# Patient Record
Sex: MALE | Race: BLACK OR AFRICAN AMERICAN | NOT HISPANIC OR LATINO
[De-identification: names, ages, dates, MRNs, and addresses within clinical notes are randomized per-mention and may not be internally consistent; named-entity substitution may affect disease eponyms.]

---

## 2021-03-02 DIAGNOSIS — Z00.00 ENCOUNTER FOR GENERAL ADULT MEDICAL EXAMINATION W/OUT ABNORMAL FINDINGS: ICD-10-CM

## 2021-03-03 ENCOUNTER — APPOINTMENT (OUTPATIENT)
Dept: ORTHOPEDIC SURGERY | Facility: CLINIC | Age: 71
End: 2021-03-03
Payer: MEDICARE

## 2021-03-03 VITALS — WEIGHT: 195 LBS | BODY MASS INDEX: 25.03 KG/M2 | HEIGHT: 74 IN

## 2021-03-03 PROCEDURE — 99205 OFFICE O/P NEW HI 60 MIN: CPT

## 2021-03-03 PROCEDURE — 99072 ADDL SUPL MATRL&STAF TM PHE: CPT

## 2021-03-03 PROCEDURE — 73564 X-RAY EXAM KNEE 4 OR MORE: CPT | Mod: RT

## 2021-03-03 RX ORDER — CELECOXIB 100 MG/1
100 CAPSULE ORAL
Qty: 60 | Refills: 2 | Status: ACTIVE | COMMUNITY
Start: 2021-03-03 | End: 1900-01-01

## 2021-03-03 NOTE — PHYSICAL EXAM
[de-identified] : Patient is well nourished, well-developed, in no acute distress, with appropriate mood and affect. The patient is oriented to time, place, and person. Respirations are even and unlabored. Gait evaluation does reveal a limp. There is no inguinal adenopathy. Examination of the contralateral knee shows normal range of motion, strength, no tenderness, and intact skin. The affected limb is well-perfused, without skin lesions, shows a grossly normal motor and sensory examination. Knee motion is significantly reduced and does cause significant pain.  Well-healed midline surgical scar.  The knee moves from 0-120 degrees. The knee is stable within that range-of-motion to AP and ML stress. The alignment of the knee is neutral. Muscle strength is normal. Pedal pulses are palpable. Hip examination was negative.\par  [de-identified] : AP, lateral, sunrise knee x-rays of the right knee were ordered and obtained in the office and demonstrate a right total knee arthroplasty with evidence of femoral component failure and subsidence on the medial side.  The tibial component appears well fixed.  \par \par The patient brings in the bone scan of the right knee.  I reviewed the bone scan with the patient which demonstrates increased uptake in the medial femoral condyle consistent with loosening.

## 2021-03-03 NOTE — DISCUSSION/SUMMARY
[de-identified] : This patient is a failed right total knee arthroplasty.  I reviewed the options with the patient with regard to nonsurgical and surgical intervention.  The patient opts for surgical intervention of a right total knee arthroplasty revision.\par \par He is a Christian and I gave him a Christian forms to fill out with his elders to determine which blood products or he will will not be willing to take.\par \par The patient is an appropriate candidate for consideration of right revision total knee arthroplasty. This recommendation is based on the patient's pain, function, and bone stock. An extensive discussion was conducted of the natural history of this particular problem and the variety of surgical and non-surgical treatment options available to the patient. A risk/benefit analysis was discussed with the patient reviewing the advantages and disadvantages of surgical intervention at this time. A full explanation was given of the nature and the purpose of the procedure and anesthesia, its benefits, possible alternative methods of diagnosis or treatment, the risks involved, the possibility of complications, the foreseeable consequences of the procedure and the possible results of the non-treatment. I reviewed the plan of care and I also used a model of a revision joint replacement implant equivalent to the one that will be used for their revision total joint replacement. The ability to secure the implant utilizing cement or cementless (press-fit) was discussed with the patient. The patient agrees with the plan of care, as well as the use of implants for their revision joint replacement. \par \par No guarantee or assurance was made as to the results that may be obtained. Specifically, the risks were identified to include, but are not limited to the following: Infection, phlebitis, pulmonary embolism, death, paralysis, dislocation, pain, stiffness, instability, limp, weakness, breakage, leg-length inequality, uncontrolled bleeding, nerve injury, blood vessel injury, pressure sores, anesthetic risks, delayed healing of wound and bone, and wear and loosening. Further discussion was undertaken with the patient about the details of surgical preparation, treatment, and postoperative rehabilitation including medical clearance, autotransfusion, the hospital course, and the postoperative rehabilitation involved. Reimplantation may require cemented or cementless components, or both, depending upon a variety of factors that must be assessed at the time of surgery. The need for bone graft (either autograft or allograft) to enhance the chance for success of the procedure(s) was discussed. All in all, I feel that this patient is a good candidate for surgical reconstruction.\par \par The patient and I discussed the current SARS-CoV-2 (COVID-19) pandemic which has affected our local hospitals. We discussed that our hospitals treat patients with COVID-19. All efforts will be made to avoid cohorting the patient with diagnosed or suspected COVID-19 patient. They also understand that we will screen them 24-48 hours prior to surgery. Despite our best efforts, there is a potential risk for iatrogenic transmission of COVID-19 to the patient during the perioperative period. Debi COVID-19 during the perioperative period may increase the patient´s risks of an adverse outcome including postoperative pneumonia, difficulty breathing, requirement for a breathing tube (general endotracheal intubation), and death. The patient is understanding of this risk, and is willing to proceed with surgery at this time.\par \par Send him to the lab for an ESR and CRP for further blood work as well.\par \par Celebrex prescribed to bridge him to surgery.

## 2021-03-03 NOTE — HISTORY OF PRESENT ILLNESS
[de-identified] : This is very nice 70-year-old gentleman experiencing 6 weeks of right knee pain, which is severe in intensity. The pain substantially limits activities of daily living. Walking tolerance is reduced.  He is referred by Dr. Clark.  He has a history of a right total knee arthroplasty February 2012 at the [facility] for special surgery by Dr. Alex.  He healed well from the surgery and did well until approximately 6 weeks ago.  Since then he has had increasing pain in the right knee that is worse than before his total knee arthroplasty.  He walks without an assistive device but walks with a very antalgic gait.  He has been ruled out for infection already by Dr. Clark with an aspiration on February 9, 2021 with results demonstrating 1076/12%/no growth.  ESR and CRP were not sent.  NSAIDs have not been helpful in improving the pain.  The patient denies any radiation of the pain to the feet and it is not associated with numbness, tingling, or weakness.  The knee does feel unstable.

## 2021-03-05 ENCOUNTER — TRANSCRIPTION ENCOUNTER (OUTPATIENT)
Age: 71
End: 2021-03-05

## 2021-03-06 ENCOUNTER — TRANSCRIPTION ENCOUNTER (OUTPATIENT)
Age: 71
End: 2021-03-06

## 2021-03-06 ENCOUNTER — RESULT REVIEW (OUTPATIENT)
Age: 71
End: 2021-03-06

## 2021-03-06 ENCOUNTER — INPATIENT (INPATIENT)
Facility: HOSPITAL | Age: 71
LOS: 0 days | Discharge: HOME | DRG: 343 | End: 2021-03-06
Attending: SURGERY | Admitting: SURGERY
Payer: MEDICARE

## 2021-03-06 VITALS
DIASTOLIC BLOOD PRESSURE: 101 MMHG | WEIGHT: 195.11 LBS | HEIGHT: 74 IN | RESPIRATION RATE: 18 BRPM | TEMPERATURE: 98 F | OXYGEN SATURATION: 96 % | SYSTOLIC BLOOD PRESSURE: 174 MMHG | HEART RATE: 93 BPM

## 2021-03-06 VITALS
RESPIRATION RATE: 16 BRPM | DIASTOLIC BLOOD PRESSURE: 80 MMHG | TEMPERATURE: 98 F | SYSTOLIC BLOOD PRESSURE: 135 MMHG | HEART RATE: 80 BPM | OXYGEN SATURATION: 97 %

## 2021-03-06 DIAGNOSIS — K37 UNSPECIFIED APPENDICITIS: ICD-10-CM

## 2021-03-06 DIAGNOSIS — Z90.49 ACQUIRED ABSENCE OF OTHER SPECIFIED PARTS OF DIGESTIVE TRACT: Chronic | ICD-10-CM

## 2021-03-06 DIAGNOSIS — Z96.659 PRESENCE OF UNSPECIFIED ARTIFICIAL KNEE JOINT: Chronic | ICD-10-CM

## 2021-03-06 DIAGNOSIS — K40.90 UNILATERAL INGUINAL HERNIA, WITHOUT OBSTRUCTION OR GANGRENE, NOT SPECIFIED AS RECURRENT: Chronic | ICD-10-CM

## 2021-03-06 LAB
ALBUMIN SERPL ELPH-MCNC: 4.3 G/DL — SIGNIFICANT CHANGE UP (ref 3.3–5)
ALP SERPL-CCNC: 91 U/L — SIGNIFICANT CHANGE UP (ref 40–120)
ALT FLD-CCNC: 17 U/L — SIGNIFICANT CHANGE UP (ref 10–45)
ANION GAP SERPL CALC-SCNC: 15 MMOL/L — SIGNIFICANT CHANGE UP (ref 5–17)
APPEARANCE UR: CLEAR — SIGNIFICANT CHANGE UP
APTT BLD: 26.3 SEC — LOW (ref 27.5–35.5)
AST SERPL-CCNC: 21 U/L — SIGNIFICANT CHANGE UP (ref 10–40)
BACTERIA # UR AUTO: NEGATIVE — SIGNIFICANT CHANGE UP
BASOPHILS # BLD AUTO: 0 K/UL — SIGNIFICANT CHANGE UP (ref 0–0.2)
BASOPHILS NFR BLD AUTO: 0 % — SIGNIFICANT CHANGE UP (ref 0–2)
BILIRUB SERPL-MCNC: 0.3 MG/DL — SIGNIFICANT CHANGE UP (ref 0.2–1.2)
BILIRUB UR-MCNC: NEGATIVE — SIGNIFICANT CHANGE UP
BLD GP AB SCN SERPL QL: NEGATIVE — SIGNIFICANT CHANGE UP
BUN SERPL-MCNC: 17 MG/DL — SIGNIFICANT CHANGE UP (ref 7–23)
CALCIUM SERPL-MCNC: 9.4 MG/DL — SIGNIFICANT CHANGE UP (ref 8.4–10.5)
CHLORIDE SERPL-SCNC: 97 MMOL/L — SIGNIFICANT CHANGE UP (ref 96–108)
CO2 SERPL-SCNC: 24 MMOL/L — SIGNIFICANT CHANGE UP (ref 22–31)
COLOR SPEC: SIGNIFICANT CHANGE UP
CREAT SERPL-MCNC: 0.95 MG/DL — SIGNIFICANT CHANGE UP (ref 0.5–1.3)
DIFF PNL FLD: NEGATIVE — SIGNIFICANT CHANGE UP
EOSINOPHIL # BLD AUTO: 0 K/UL — SIGNIFICANT CHANGE UP (ref 0–0.5)
EOSINOPHIL NFR BLD AUTO: 0 % — SIGNIFICANT CHANGE UP (ref 0–6)
EPI CELLS # UR: 0 /HPF — SIGNIFICANT CHANGE UP
GAS PNL BLDV: SIGNIFICANT CHANGE UP
GLUCOSE SERPL-MCNC: 132 MG/DL — HIGH (ref 70–99)
GLUCOSE UR QL: NEGATIVE — SIGNIFICANT CHANGE UP
HCT VFR BLD CALC: 44.3 % — SIGNIFICANT CHANGE UP (ref 39–50)
HGB BLD-MCNC: 13 G/DL — SIGNIFICANT CHANGE UP (ref 13–17)
HYALINE CASTS # UR AUTO: 0 /LPF — SIGNIFICANT CHANGE UP (ref 0–2)
INR BLD: 1.01 RATIO — SIGNIFICANT CHANGE UP (ref 0.88–1.16)
KETONES UR-MCNC: NEGATIVE — SIGNIFICANT CHANGE UP
LEUKOCYTE ESTERASE UR-ACNC: NEGATIVE — SIGNIFICANT CHANGE UP
LIDOCAIN IGE QN: 67 U/L — HIGH (ref 7–60)
LYMPHOCYTES # BLD AUTO: 0.95 K/UL — LOW (ref 1–3.3)
LYMPHOCYTES # BLD AUTO: 8 % — LOW (ref 13–44)
MCHC RBC-ENTMCNC: 20.9 PG — LOW (ref 27–34)
MCHC RBC-ENTMCNC: 29.3 GM/DL — LOW (ref 32–36)
MCV RBC AUTO: 71.2 FL — LOW (ref 80–100)
MONOCYTES # BLD AUTO: 0.86 K/UL — SIGNIFICANT CHANGE UP (ref 0–0.9)
MONOCYTES NFR BLD AUTO: 7.2 % — SIGNIFICANT CHANGE UP (ref 2–14)
NEUTROPHILS # BLD AUTO: 10.1 K/UL — HIGH (ref 1.8–7.4)
NEUTROPHILS NFR BLD AUTO: 84.8 % — HIGH (ref 43–77)
NITRITE UR-MCNC: NEGATIVE — SIGNIFICANT CHANGE UP
PH UR: 6.5 — SIGNIFICANT CHANGE UP (ref 5–8)
PLATELET # BLD AUTO: 289 K/UL — SIGNIFICANT CHANGE UP (ref 150–400)
POTASSIUM SERPL-MCNC: 3.7 MMOL/L — SIGNIFICANT CHANGE UP (ref 3.5–5.3)
POTASSIUM SERPL-SCNC: 3.7 MMOL/L — SIGNIFICANT CHANGE UP (ref 3.5–5.3)
PROT SERPL-MCNC: 7.6 G/DL — SIGNIFICANT CHANGE UP (ref 6–8.3)
PROT UR-MCNC: NEGATIVE — SIGNIFICANT CHANGE UP
PROTHROM AB SERPL-ACNC: 12.1 SEC — SIGNIFICANT CHANGE UP (ref 10.6–13.6)
RBC # BLD: 6.22 M/UL — HIGH (ref 4.2–5.8)
RBC # FLD: 17.1 % — HIGH (ref 10.3–14.5)
RBC CASTS # UR COMP ASSIST: 0 /HPF — SIGNIFICANT CHANGE UP (ref 0–4)
RH IG SCN BLD-IMP: POSITIVE — SIGNIFICANT CHANGE UP
SARS-COV-2 RNA SPEC QL NAA+PROBE: SIGNIFICANT CHANGE UP
SODIUM SERPL-SCNC: 136 MMOL/L — SIGNIFICANT CHANGE UP (ref 135–145)
SP GR SPEC: 1.02 — SIGNIFICANT CHANGE UP (ref 1.01–1.02)
UROBILINOGEN FLD QL: NEGATIVE — SIGNIFICANT CHANGE UP
WBC # BLD: 11.91 K/UL — HIGH (ref 3.8–10.5)
WBC # FLD AUTO: 11.91 K/UL — HIGH (ref 3.8–10.5)
WBC UR QL: 0 /HPF — SIGNIFICANT CHANGE UP (ref 0–5)

## 2021-03-06 PROCEDURE — 88304 TISSUE EXAM BY PATHOLOGIST: CPT | Mod: 26

## 2021-03-06 PROCEDURE — 74177 CT ABD & PELVIS W/CONTRAST: CPT | Mod: 26,MA

## 2021-03-06 PROCEDURE — 93010 ELECTROCARDIOGRAM REPORT: CPT

## 2021-03-06 PROCEDURE — 99285 EMERGENCY DEPT VISIT HI MDM: CPT

## 2021-03-06 RX ORDER — CEFOTETAN DISODIUM 1 G
1 VIAL (EA) INJECTION ONCE
Refills: 0 | Status: DISCONTINUED | OUTPATIENT
Start: 2021-03-06 | End: 2021-03-06

## 2021-03-06 RX ORDER — ENOXAPARIN SODIUM 100 MG/ML
40 INJECTION SUBCUTANEOUS EVERY 24 HOURS
Refills: 0 | Status: DISCONTINUED | OUTPATIENT
Start: 2021-03-06 | End: 2021-03-06

## 2021-03-06 RX ORDER — AMLODIPINE BESYLATE 2.5 MG/1
10 TABLET ORAL EVERY 24 HOURS
Refills: 0 | Status: DISCONTINUED | OUTPATIENT
Start: 2021-03-06 | End: 2021-03-06

## 2021-03-06 RX ORDER — CEFOTETAN DISODIUM 1 G
2 VIAL (EA) INJECTION EVERY 12 HOURS
Refills: 0 | Status: DISCONTINUED | OUTPATIENT
Start: 2021-03-06 | End: 2021-03-06

## 2021-03-06 RX ORDER — OXYCODONE HYDROCHLORIDE 5 MG/1
5 TABLET ORAL EVERY 6 HOURS
Refills: 0 | Status: DISCONTINUED | OUTPATIENT
Start: 2021-03-06 | End: 2021-03-06

## 2021-03-06 RX ORDER — ACETAMINOPHEN 500 MG
650 TABLET ORAL EVERY 6 HOURS
Refills: 0 | Status: DISCONTINUED | OUTPATIENT
Start: 2021-03-06 | End: 2021-03-06

## 2021-03-06 RX ORDER — ACETAMINOPHEN 500 MG
2 TABLET ORAL
Qty: 0 | Refills: 0 | DISCHARGE
Start: 2021-03-06

## 2021-03-06 RX ORDER — SODIUM CHLORIDE 9 MG/ML
1000 INJECTION, SOLUTION INTRAVENOUS
Refills: 0 | Status: DISCONTINUED | OUTPATIENT
Start: 2021-03-06 | End: 2021-03-06

## 2021-03-06 RX ORDER — ACETAMINOPHEN 500 MG
975 TABLET ORAL ONCE
Refills: 0 | Status: COMPLETED | OUTPATIENT
Start: 2021-03-06 | End: 2021-03-06

## 2021-03-06 RX ADMIN — Medication 100 GRAM(S): at 08:10

## 2021-03-06 RX ADMIN — SODIUM CHLORIDE 100 MILLILITER(S): 9 INJECTION, SOLUTION INTRAVENOUS at 08:50

## 2021-03-06 RX ADMIN — Medication 975 MILLIGRAM(S): at 04:00

## 2021-03-06 RX ADMIN — OXYCODONE HYDROCHLORIDE 5 MILLIGRAM(S): 5 TABLET ORAL at 08:35

## 2021-03-06 NOTE — H&P ADULT - NSHPLABSRESULTS_GEN_ALL_CORE
----------  LABORATORY DATA:  ----------                        13.0   11.91 )-----------( 289      ( 06 Mar 2021 03:49 )             44.3               03-    136  |  97  |  17  ----------------------------<  132<H>  3.7   |  24  |  0.95    Ca    9.4      06 Mar 2021 03:49    TPro  7.6  /  Alb  4.3  /  TBili  0.3  /  DBili  x   /  AST  21  /  ALT  17  /  AlkPhos  91  03-06    LIVER FUNCTIONS - ( 06 Mar 2021 03:49 )  Alb: 4.3 g/dL / Pro: 7.6 g/dL / ALK PHOS: 91 U/L / ALT: 17 U/L / AST: 21 U/L / GGT: x           PT/INR - ( 06 Mar 2021 03:49 )   PT: 12.1 sec;   INR: 1.01 ratio    PTT - ( 06 Mar 2021 03:49 )  PTT:26.3 sec    Urinalysis Basic - ( 06 Mar 2021 03:49 )    Color: Light Yellow / Appearance: Clear / S.021 / pH: x  Gluc: x / Ketone: Negative  / Bili: Negative / Urobili: Negative   Blood: x / Protein: Negative / Nitrite: Negative   Leuk Esterase: Negative / RBC: 0 /hpf / WBC 0 /HPF   Sq Epi: x / Non Sq Epi: 0 /hpf / Bacteria: Negative    ----------  RADIOGRAPHIC DATA:  ---------    < from: CT Abdomen and Pelvis w/ IV Cont (21 @ 05:23) >    FINDINGS:    LOWER CHEST: Within normal limits.    LIVER: Within normal limits.  BILE DUCTS: Mild intra and extra hepatic biliary dilatation with common bile duct measuring 1 cm, postcholecystectomy.  GALLBLADDER: Cholecystectomy.  SPLEEN: Within normal limits.  PANCREAS: Within normal limits.  ADRENALS: Bilateral adrenal nodules, largest measuring up to 2.1 cm on the right side. Consider nonemergent correlation with MR provided no contraindications.  KIDNEYS/URETERS: Within normal limits.    BLADDER: Within normal limits.  REPRODUCTIVE ORGANS: Heterogeneous prominent prostate measuring up to 5.5 cm, cause mild mass effect and protrusion of the bladder base.    BOWEL: Base as well as proximal appendix is dilated and fluid-filled measuring up to 1.2 cm with thickened wall and hyperemia, compatible with acute appendicitis. Trace periappendiceal stranding. No signs of appendiceal abscess or perforation.No bowel obstruction. Nonspecific twisting of bowel mesentery identified in the left abdomen. Surgical clips identified surrounding the stomach.  PERITONEUM: No ascites.  VESSELS:  Within normal limits.  RETROPERITONEUM: No lymphadenopathy.  ABDOMINAL WALL: A small right groin hernia containing nonobstructive distal ileal folds identified.  BONES: Multilevel degenerative changes of the spine. Mildanterolisthesis of L4 on L5 secondary to bilateral facet spondylolysis. Advanced degenerative disc disease at L5-S1.    IMPRESSION:    Findings compatible with acute appendicitis. No signs of perforation or periappendiceal abscess at this time.    Additional findings as mentioned above.    < end of copied text >

## 2021-03-06 NOTE — H&P ADULT - HISTORY OF PRESENT ILLNESS
70M hx of htn p/w 1d of mid abd pain progressive to the rlq. Pain started approx 18:00 on day prior to presentation was sharp in nature, tolerated a diet and had formed BM.  Pain did not resolve, progressed to localize to the RLQ. Patient looked up condition online found that this could be appendicitis and presented to ED for further evaluation.     Denies ha / cp / sob / dizziness / nausea / vomiting / fevers / chills.     Of note, patient is a [employer] and refuses blood products.

## 2021-03-06 NOTE — ED PROVIDER NOTE - PHYSICAL EXAMINATION
Gen: AAOx3, non-toxic  Head: NCAT  HEENT: EOMI, oral mucosa moist, normal conjunctiva  Lung: CTAB, no respiratory distress, speaking in full sentences  CV: RRR, no murmurs, rubs or gallops  Abd: soft, RLQ ttp, no guarding, no CVA tenderness  MSK: no visible deformities  : Completed by Dr. Cuevas  Neuro: No focal sensory or motor deficits  Skin: Warm, well perfused, no rash  Psych: normal affect.   ~Mario Higgins M.D. Resident

## 2021-03-06 NOTE — ED ADULT NURSE NOTE - NSIMPLEMENTINTERV_GEN_ALL_ED
Implemented All Universal Safety Interventions:  [place] to call system. Call bell, personal items and telephone within reach. Instruct patient to call for assistance. Room bathroom lighting operational. Non-slip footwear when patient is off stretcher. Physically safe environment: no spills, clutter or unnecessary equipment. Stretcher in lowest position, wheels locked, appropriate side rails in place.

## 2021-03-06 NOTE — ED PROVIDER NOTE - NS ED ROS FT
GENERAL: No fever or chills, EYES: no change in vision, HEENT: no trouble swallowing or speaking, CARDIAC: no chest pain, PULMONARY: no cough or SOB, GI: +abdominal pain, no nausea, no vomiting, no diarrhea or constipation, : No changes in urination, SKIN: no rashes, NEURO: no headache,  MSK: No joint pain ~Mario Higgins M.D. Resident

## 2021-03-06 NOTE — ED ADULT NURSE NOTE - CHIEF COMPLAINT QUOTE
RLQ abdominal pain starting today at 6pm. States the pain began in periumbilical area then moved to RLQ accompanied by intermittent nausea. RLQ tender to palpation. Denies vomiting, diarrhea, fever. Reports having a CT scan of abdomen pelvis with IV contrast earlier today due to abnormal blood test result but does not have results. PMH of HTN.

## 2021-03-06 NOTE — ED PROVIDER NOTE - ATTENDING CONTRIBUTION TO CARE
attending Pollack: 70yM h/o HTN, remote h/o cholecystectomy p/w 9 hours of abdominal pain. Began as sharp periumbilical pain, then migrated to RLQ assoc with nausea. Denies vomiting, fever, chills, anorexia, urinary symptoms. Incidentally, pt had outpatient CT A/P yesterday for possible renal ?lesion being followed outpatient. Reports normal renal function. On exam, well appearing, afebrile, abdomen soft, tender in RLQ without r/g/r, negative Rovsing sign, no testicular tenderness. Will obtain labs, UA, CT A/P eval appendicitis vs diverticulitis, pain control, reassess.

## 2021-03-06 NOTE — ED ADULT TRIAGE NOTE - CHIEF COMPLAINT QUOTE
RLQ abdominal pain starting today at 6pm. States the pain began in periumbilical area then moved to RLQ. RLQ tender to palpation. Denies nausea, vomiting, diarrhea, fever. PMH of HTN. RLQ abdominal pain starting today at 6pm. States the pain began in periumbilical area then moved to RLQ accompanied by intermittent nausea. RLQ tender to palpation. Denies vomiting, diarrhea, fever. Reports having a CT scan of abdomen pelvis with IV contrast earlier today due to abnormal blood test result but does not have results. PMH of HTN.

## 2021-03-06 NOTE — DISCHARGE NOTE PROVIDER - CARE PROVIDER_API CALL
Bayron Yañez)  Surgery  [address] [facility] [address]  [place] [address]  Phone: [phone]  Fax: [phone]  Follow Up Time: 2 weeks

## 2021-03-06 NOTE — H&P ADULT - ASSESSMENT
70M [employer] p/w 1d of abd pain found to have clinical, laboratory and radiographic findings consistent with acute, non-perforated appendicitis. Patient hemodynamically stable in NAD at present time, will plan for admission and appendectomy.     - admit to general / red surgery, KARLY SUTTON MD, floor bed  - diet: NPO  - IVF: LR @ 100cc/h  - ABX: Cefotetan   - pain control  - vte ppx  - encourage ambulation / cough / deep breathing / incentive spirometry   - disposition: floor admission at present time  - consented for laparoscopic appendectomy, possible open appendectomy - NO BLOOD PRODUCTS  - added on to OR    Plan discussed with attending surgeon KARLY SUTTON MD.     Please contact [facility] Surgery (P: [phone]) with any questions.    KARLY Joshua MD, PGY-III  Surgery, Red Team  Pager: [phone]  [facility]

## 2021-03-06 NOTE — ED PROVIDER NOTE - CLINICAL SUMMARY MEDICAL DECISION MAKING FREE TEXT BOX
70yM h/o HTN presents with RLQ pain and nausea of 1 day duration. Concern for but not limited to appy vs gastritis vs pancreatitis vs biliary pathology. Will get ekg, labs, blood gas, lipase, UA, CT abd pelvis/ pain management and reassess

## 2021-03-06 NOTE — H&P ADULT - NSHPPHYSICALEXAM_GEN_ALL_CORE
Vital Signs Last 24 Hrs  T(C): 36.7 (06 Mar 2021 03:26), Max: 36.7 (06 Mar 2021 03:26)  T(F): 98 (06 Mar 2021 03:26), Max: 98 (06 Mar 2021 03:26)  HR: 90 (06 Mar 2021 03:26) (90 - 93)  BP: 161/90 (06 Mar 2021 03:26) (161/90 - 174/101)  RR: 17 (06 Mar 2021 03:26) (17 - 18)  SpO2: 97% (06 Mar 2021 03:26) (96% - 97%)    PHYSICAL EXAM:  Gen: WD, WN, in no acute distress.  HEENT: PERRLA, EOMI. oropharynx clear.  Lungs: Respirations unlabored.   Abd: Soft, tender in rlq, nondistended.

## 2021-03-06 NOTE — ED ADULT NURSE NOTE - CHPI ED NUR SYMPTOMS NEG
no abdominal distension/no blood in stool/no burning urination/no chills/no diarrhea/no dysuria/no hematuria/no vomiting

## 2021-03-06 NOTE — ED PROVIDER NOTE - OBJECTIVE STATEMENT
70yM h/o HTN presents with abdominal pain of 1 day duration. Patient reports periumbilical/ruq initially which has migrated to RLQ with associated nausea. Underwent CT abd/pelvis with contrast earlier in the afternoon to evaluate for renal pathology but pain started afterwards. No fever, chest pain, back pain, sob, urinary or bowel irregularities.

## 2021-03-06 NOTE — PRE-OP CHECKLIST - TEMPERATURE IN FAHRENHEIT (DEGREES F)
Rama returned call to Sherin Partida's office, but staff was unavailable.  Please return her call, but if she is not available she stated it is ok to leave a detailed message on her voicemail.   98.2

## 2021-03-06 NOTE — DISCHARGE NOTE NURSING/CASE MANAGEMENT/SOCIAL WORK - PATIENT PORTAL LINK FT
You can access the FollowMyHealth Patient Portal offered by [facility] by registering at the following website: [email or web address]. By joining [facility]’s FollowMyHealth portal, you will also be able to view your health information using other applications (apps) compatible with our system.

## 2021-03-06 NOTE — ED ADULT NURSE NOTE - OBJECTIVE STATEMENT
70y old male PMH HTN, cataracts 70y old male PMH HTN, cataracts, 70y old male PMH HTN, cataracts walk in from triage c/o abdominal pain. A&Ox3. Patient states he was having abdominal pain in lower right quadrant starting today at 6pm, the pain had started in the periumbilical area then moved to lower right abdomen endorses intermittent nausea. He states that he had received an CT scan due to an abnormal lab result but does not have results. He denies vomiting, diarrhea, fever, chest pain, sob, ha, urinary symptoms, hematuria. Patient is well appearing, gross neuro intact, lungs cta bilaterally, no difficulty speaking in complete sentences, abdomen soft tender to palpation in lower right quadrant nondistended, skin intact. IV inserted, call bell within reach.

## 2021-03-06 NOTE — DISCHARGE NOTE PROVIDER - NSDCMRMEDTOKEN_GEN_ALL_CORE_FT
acetaminophen 325 mg oral tablet: 2 tab(s) orally every 6 hours, As needed, Mild Pain (1 - 3)  AMLODIPINE BESYLATE 10 MG TAB: TAKE 1 TABLET BY MOUTH EVERY DAY

## 2021-03-06 NOTE — DISCHARGE NOTE PROVIDER - HOSPITAL COURSE
You were diagnosed with acute appendicitis and underwent laparoscopic appendectomy in the OR on 3/6/21. The patient tolerated the procedure well. Post-operatively the patient was sent to the PACU. The patient was hemodynamically stable and was transferred to a surgical floor. The patient's pain was controlled by IV pain medications and then by PO pain medications. The patient was started on a regular diet and tolerated it well. The patient was placed on home medications. At the time of discharge, the patient was hemodynamically stable, was tolerating PO diet, was voiding urine and passing stool, was ambulating, and was comfortable with adequate pain control. The patient was instructed to follow up with Dr. Yañez within 2 weeks after discharge from the hospital. The patient felt comfortable with discharge. The patient was discharged home with instructions to take over the counter tylenol extra strength every 6 hours as needed. The patient had no other issues.

## 2021-03-06 NOTE — DISCHARGE NOTE PROVIDER - NSDCFUADDINST_GEN_ALL_CORE_FT
WOUND CARE:  Please keep incisions clean and dry. Please do not Scrub or rub incisions. Do not use lotion or powder on incisions.   BATHING: You may shower and/or sponge bathe. You may use warm soapy water in the shower and rinse, pat dry.  ACTIVITY: No heavy lifting or straining. Otherwise, you may return to your usual level of physical activity. If you are taking narcotic pain medication DO NOT drive a car, operate machinery or make important decisions.  DIET: Return to your usual diet.  NOTIFY YOUR SURGEON IF YOU HAVE: any bleeding that does not stop, any pus draining from your wound(s), any fever (over 100.4 F) persistent nausea/vomiting, or if your pain is not controlled on your discharge pain medications, unable to urinate.  Please follow up with your primary care physician in one week regarding your hospitalization, bring copies of your discharge paperwork.  Please follow up with your surgeon, Dr. Yañez

## 2021-03-06 NOTE — H&P ADULT - ATTENDING COMMENTS
pt seen and examined  agree with above  npo, ivf, iv abx  r/b/a d/w pt for lap appy, poss open  consent signed in chart.  no blood products per pt wishes  or today.

## 2021-03-15 LAB — SURGICAL PATHOLOGY STUDY: SIGNIFICANT CHANGE UP

## 2021-03-16 PROCEDURE — 93005 ELECTROCARDIOGRAM TRACING: CPT

## 2021-03-16 PROCEDURE — 83690 ASSAY OF LIPASE: CPT

## 2021-03-16 PROCEDURE — C9399: CPT

## 2021-03-16 PROCEDURE — U0005: CPT

## 2021-03-16 PROCEDURE — C1889: CPT

## 2021-03-16 PROCEDURE — 81001 URINALYSIS AUTO W/SCOPE: CPT

## 2021-03-16 PROCEDURE — 83605 ASSAY OF LACTIC ACID: CPT

## 2021-03-16 PROCEDURE — 84295 ASSAY OF SERUM SODIUM: CPT

## 2021-03-16 PROCEDURE — 86900 BLOOD TYPING SEROLOGIC ABO: CPT

## 2021-03-16 PROCEDURE — 82803 BLOOD GASES ANY COMBINATION: CPT

## 2021-03-16 PROCEDURE — 86901 BLOOD TYPING SEROLOGIC RH(D): CPT

## 2021-03-16 PROCEDURE — 74177 CT ABD & PELVIS W/CONTRAST: CPT

## 2021-03-16 PROCEDURE — 85610 PROTHROMBIN TIME: CPT

## 2021-03-16 PROCEDURE — 85018 HEMOGLOBIN: CPT

## 2021-03-16 PROCEDURE — 85730 THROMBOPLASTIN TIME PARTIAL: CPT

## 2021-03-16 PROCEDURE — 87635 SARS-COV-2 COVID-19 AMP PRB: CPT

## 2021-03-16 PROCEDURE — 82947 ASSAY GLUCOSE BLOOD QUANT: CPT

## 2021-03-16 PROCEDURE — 80053 COMPREHEN METABOLIC PANEL: CPT

## 2021-03-16 PROCEDURE — 85014 HEMATOCRIT: CPT

## 2021-03-16 PROCEDURE — 99285 EMERGENCY DEPT VISIT HI MDM: CPT | Mod: 25

## 2021-03-16 PROCEDURE — 85025 COMPLETE CBC W/AUTO DIFF WBC: CPT

## 2021-03-16 PROCEDURE — 88304 TISSUE EXAM BY PATHOLOGIST: CPT

## 2021-03-16 PROCEDURE — 96374 THER/PROPH/DIAG INJ IV PUSH: CPT

## 2021-03-16 PROCEDURE — 86850 RBC ANTIBODY SCREEN: CPT

## 2021-03-16 PROCEDURE — 82330 ASSAY OF CALCIUM: CPT

## 2021-03-16 PROCEDURE — 84132 ASSAY OF SERUM POTASSIUM: CPT

## 2021-03-16 PROCEDURE — 82435 ASSAY OF BLOOD CHLORIDE: CPT

## 2021-03-20 PROBLEM — I10 ESSENTIAL (PRIMARY) HYPERTENSION: Chronic | Status: ACTIVE | Noted: 2021-03-06

## 2021-03-23 ENCOUNTER — APPOINTMENT (OUTPATIENT)
Dept: ORTHOPEDIC SURGERY | Facility: CLINIC | Age: 71
End: 2021-03-23
Payer: MEDICARE

## 2021-03-23 PROCEDURE — 99214 OFFICE O/P EST MOD 30 MIN: CPT

## 2021-03-23 PROCEDURE — 99072 ADDL SUPL MATRL&STAF TM PHE: CPT

## 2021-03-23 NOTE — PHYSICAL EXAM
[de-identified] : Patient is well nourished, well-developed, in no acute distress, with appropriate mood and affect. The patient is oriented to time, place, and person. Respirations are even and unlabored. Gait evaluation does reveal a limp. There is no inguinal adenopathy. Examination of the contralateral knee shows normal range of motion, strength, no tenderness, and intact skin. The affected limb is well-perfused, without skin lesions, shows a grossly normal motor and sensory examination. Knee motion is significantly reduced and does cause significant pain.  Well-healed midline surgical scar.  The knee moves from 0-120 degrees. The knee is stable within that range-of-motion to AP and ML stress. The alignment of the knee is neutral. Muscle strength is normal. Pedal pulses are palpable. Hip examination was negative.\par  [de-identified] : AP, lateral, sunrise knee x-rays of the right knee were brought in by the patient which I reviewed and demonstrate a right total knee arthroplasty with evidence of femoral component failure and subsidence on the medial side.  The tibial component appears well fixed.  \par \par The patient brings in the bone scan of the right knee.  I reviewed the bone scan with the patient which demonstrates increased uptake in the medial femoral condyle consistent with loosening.

## 2021-03-23 NOTE — HISTORY OF PRESENT ILLNESS
[de-identified] : This is very nice 70-year-old gentleman experiencing 2 months of right knee pain, which is severe in intensity.  I have previously diagnosed him with a failed right total knee arthroplasty secondary to femoral component loosening.  The pain substantially limits activities of daily living. Walking tolerance is reduced.  He is referred by Dr. Clark.  He has a history of a right total knee arthroplasty February 2012 at the [facility] by Dr. Alex.  He healed well from the surgery and did well until approximately 6 weeks ago.  Since then he has had increasing pain in the right knee that is worse than before his total knee arthroplasty.  He walks without an assistive device but walks with a very antalgic gait.  He has been ruled out for infection already by Dr. Clark with an aspiration on February 9, 2021 with results demonstrating 1076/12%/no growth.  ESR and CRP were not sent.  NSAIDs have not been helpful in improving the pain.  The patient denies any radiation of the pain to the feet and it is not associated with numbness, tingling, or weakness.  The knee does feel unstable.

## 2021-03-23 NOTE — DISCUSSION/SUMMARY
[de-identified] : This patient is a failed right total knee arthroplasty.  I reviewed the options with the patient with regard to nonsurgical and surgical intervention.  The patient opts for surgical intervention of a right total knee arthroplasty revision.  He had several questions in advance of surgery and we reviewed this together today.  I also prescribed him a range of motion knee brace with side hinges.\par \par He is a Taoist and I gave him a Taoist forms to fill out with his elders to determine which blood products or he will will not be willing to take.\par \par The patient is an appropriate candidate for consideration of right revision total knee arthroplasty. This recommendation is based on the patient's pain, function, and bone stock. An extensive discussion was conducted of the natural history of this particular problem and the variety of surgical and non-surgical treatment options available to the patient. A risk/benefit analysis was discussed with the patient reviewing the advantages and disadvantages of surgical intervention at this time. A full explanation was given of the nature and the purpose of the procedure and anesthesia, its benefits, possible alternative methods of diagnosis or treatment, the risks involved, the possibility of complications, the foreseeable consequences of the procedure and the possible results of the non-treatment. I reviewed the plan of care and I also used a model of a revision joint replacement implant equivalent to the one that will be used for their revision total joint replacement. The ability to secure the implant utilizing cement or cementless (press-fit) was discussed with the patient. The patient agrees with the plan of care, as well as the use of implants for their revision joint replacement. \par \par No guarantee or assurance was made as to the results that may be obtained. Specifically, the risks were identified to include, but are not limited to the following: Infection, phlebitis, pulmonary embolism, death, paralysis, dislocation, pain, stiffness, instability, limp, weakness, breakage, leg-length inequality, uncontrolled bleeding, nerve injury, blood vessel injury, pressure sores, anesthetic risks, delayed healing of wound and bone, and wear and loosening. Further discussion was undertaken with the patient about the details of surgical preparation, treatment, and postoperative rehabilitation including medical clearance, autotransfusion, the hospital course, and the postoperative rehabilitation involved. Reimplantation may require cemented or cementless components, or both, depending upon a variety of factors that must be assessed at the time of surgery. The need for bone graft (either autograft or allograft) to enhance the chance for success of the procedure(s) was discussed. All in all, I feel that this patient is a good candidate for surgical reconstruction.\par \par The patient and I discussed the current SARS-CoV-2 (COVID-19) pandemic which has affected our local hospitals. We discussed that our hospitals treat patients with COVID-19. All efforts will be made to avoid cohorting the patient with diagnosed or suspected COVID-19 patient. They also understand that we will screen them 24-48 hours prior to surgery. Despite our best efforts, there is a potential risk for iatrogenic transmission of COVID-19 to the patient during the perioperative period. Debi COVID-19 during the perioperative period may increase the patient´s risks of an adverse outcome including postoperative pneumonia, difficulty breathing, requirement for a breathing tube (general endotracheal intubation), and death. The patient is understanding of this risk, and is willing to proceed with surgery at this time.

## 2021-03-23 NOTE — REASON FOR VISIT
[Initial Visit] : an initial visit for [Follow-Up Visit] : a follow-up visit for [Artificial Knee Joint] : artificial knee joint [Knee Pain] : knee pain

## 2021-03-25 ENCOUNTER — OUTPATIENT (OUTPATIENT)
Dept: OUTPATIENT SERVICES | Facility: HOSPITAL | Age: 71
LOS: 1 days | End: 2021-03-25
Payer: MEDICARE

## 2021-03-25 VITALS
WEIGHT: 195.11 LBS | RESPIRATION RATE: 16 BRPM | DIASTOLIC BLOOD PRESSURE: 94 MMHG | OXYGEN SATURATION: 96 % | HEART RATE: 84 BPM | TEMPERATURE: 99 F | SYSTOLIC BLOOD PRESSURE: 150 MMHG | HEIGHT: 74 IN

## 2021-03-25 DIAGNOSIS — K40.90 UNILATERAL INGUINAL HERNIA, WITHOUT OBSTRUCTION OR GANGRENE, NOT SPECIFIED AS RECURRENT: Chronic | ICD-10-CM

## 2021-03-25 DIAGNOSIS — M17.11 UNILATERAL PRIMARY OSTEOARTHRITIS, RIGHT KNEE: ICD-10-CM

## 2021-03-25 DIAGNOSIS — Z96.659 PRESENCE OF UNSPECIFIED ARTIFICIAL KNEE JOINT: Chronic | ICD-10-CM

## 2021-03-25 DIAGNOSIS — H26.9 UNSPECIFIED CATARACT: Chronic | ICD-10-CM

## 2021-03-25 DIAGNOSIS — Z90.49 ACQUIRED ABSENCE OF OTHER SPECIFIED PARTS OF DIGESTIVE TRACT: Chronic | ICD-10-CM

## 2021-03-25 DIAGNOSIS — Z29.9 ENCOUNTER FOR PROPHYLACTIC MEASURES, UNSPECIFIED: ICD-10-CM

## 2021-03-25 DIAGNOSIS — Z96.652 PRESENCE OF LEFT ARTIFICIAL KNEE JOINT: Chronic | ICD-10-CM

## 2021-03-25 DIAGNOSIS — Z01.818 ENCOUNTER FOR OTHER PREPROCEDURAL EXAMINATION: ICD-10-CM

## 2021-03-25 DIAGNOSIS — T84.032A MECHANICAL LOOSENING OF INTERNAL RIGHT KNEE PROSTHETIC JOINT, INITIAL ENCOUNTER: ICD-10-CM

## 2021-03-25 LAB
A1C WITH ESTIMATED AVERAGE GLUCOSE RESULT: 6 % — HIGH (ref 4–5.6)
ESTIMATED AVERAGE GLUCOSE: 126 MG/DL — HIGH (ref 68–114)
MRSA PCR RESULT.: SIGNIFICANT CHANGE UP
S AUREUS DNA NOSE QL NAA+PROBE: SIGNIFICANT CHANGE UP

## 2021-03-25 PROCEDURE — 83036 HEMOGLOBIN GLYCOSYLATED A1C: CPT

## 2021-03-25 PROCEDURE — 87640 STAPH A DNA AMP PROBE: CPT

## 2021-03-25 PROCEDURE — 87641 MR-STAPH DNA AMP PROBE: CPT

## 2021-03-25 PROCEDURE — G0463: CPT

## 2021-03-25 RX ORDER — SODIUM CHLORIDE 9 MG/ML
3 INJECTION INTRAMUSCULAR; INTRAVENOUS; SUBCUTANEOUS EVERY 8 HOURS
Refills: 0 | Status: DISCONTINUED | OUTPATIENT
Start: 2021-04-08 | End: 2021-04-08

## 2021-03-25 RX ORDER — PANTOPRAZOLE SODIUM 20 MG/1
40 TABLET, DELAYED RELEASE ORAL
Refills: 0 | Status: DISCONTINUED | OUTPATIENT
Start: 2021-04-08 | End: 2021-04-08

## 2021-03-25 RX ORDER — LIDOCAINE HCL 20 MG/ML
0.2 VIAL (ML) INJECTION ONCE
Refills: 0 | Status: DISCONTINUED | OUTPATIENT
Start: 2021-04-08 | End: 2021-04-08

## 2021-03-25 RX ORDER — CEFAZOLIN SODIUM 1 G
2000 VIAL (EA) INJECTION ONCE
Refills: 0 | Status: DISCONTINUED | OUTPATIENT
Start: 2021-04-08 | End: 2021-04-10

## 2021-03-25 NOTE — H&P PST ADULT - NSICDXPASTSURGICALHX_GEN_ALL_CORE_FT
PAST SURGICAL HISTORY:  Cataract bilateral 2021 Feb    H/O total knee replacement left in 2011 and right in 2012    Inguinal hernia left - repair done in 1989    S/P laparoscopic cholecystectomy

## 2021-03-25 NOTE — H&P PST ADULT - ASSESSMENT
69 y/o scheduled for revision of right total knee replacement on 3/8/21 with DR Cardona.  Pre op testing today.  CAPRINI SCORE [CLOT]    AGE RELATED RISK FACTORS                                                       MOBILITY RELATED FACTORS  [ ] Age 41-60 years                                            (1 Point)                  [ ] Bed rest                                                        (1 Point)  [x ] Age: 61-74 years                                           (2 Points)                 [ ] Plaster cast                                                   (2 Points)  [ ] Age= 75 years                                              (3 Points)                 [ ] Bed bound for more than 72 hours                 (2 Points)    DISEASE RELATED RISK FACTORS                                               GENDER SPECIFIC FACTORS  [ ] Edema in the lower extremities                       (1 Point)                  [ ] Pregnancy                                                     (1 Point)  [ ] Varicose veins                                               (1 Point)                  [ ] Post-partum < 6 weeks                                   (1 Point)             [x ] BMI > 25 Kg/m2                                            (1 Point)                  [ ] Hormonal therapy  or oral contraception          (1 Point)                 [ ] Sepsis (in the previous month)                        (1 Point)                  [ ] History of pregnancy complications                 (1 point)  [ ] Pneumonia or serious lung disease                                               [ ] Unexplained or recurrent                     (1 Point)           (in the previous month)                               (1 Point)  [ ] Abnormal pulmonary function test                     (1 Point)                 SURGERY RELATED RISK FACTORS  [ ] Acute myocardial infarction                              (1 Point)                 [ ]  Section                                             (1 Point)  [ ] Congestive heart failure (in the previous month)  (1 Point)               [ ] Minor surgery                                                  (1 Point)   [ ] Inflammatory bowel disease                             (1 Point)                 [ ] Arthroscopic surgery                                        (2 Points)  [ ] Central venous access                                      (2 Points)                [ ] General surgery lasting more than 45 minutes   (2 Points)       [ ] Stroke (in the previous month)                          (5 Points)               [x ] Elective arthroplasty                                         (5 Points)                                                                                                                                               HEMATOLOGY RELATED FACTORS                                                 TRAUMA RELATED RISK FACTORS  [ ] Prior episodes of VTE                                     (3 Points)                [ ] Fracture of the hip, pelvis, or leg                       (5 Points)  [ ] Positive family history for VTE                         (3 Points)                 [ ] Acute spinal cord injury (in the previous month)  (5 Points)  [ ] Prothrombin 56066 A                                     (3 Points)                 [ ] Paralysis  (less than 1 month)                             (5 Points)  [ ] Factor V Leiden                                             (3 Points)                  [ ] Multiple Trauma within 1 month                        (5 Points)  [ ] Lupus anticoagulants                                     (3 Points)                                                           [ ] Anticardiolipin antibodies                               (3 Points)                                                       [ ] High homocysteine in the blood                      (3 Points)                                             [ ] Other congenital or acquired thrombophilia      (3 Points)                                                [ ] Heparin induced thrombocytopenia                  (3 Points)                                          Total Score [   8       ]    Caprini Score 0 - 2:  Low Risk, No VTE Prophylaxis required for most patients, encourage ambulation  Caprini Score 3 - 6:  At Risk, pharmacologic VTE prophylaxis is indicated for most patients (in the absence of a contraindication)  Caprini Score Greater than or = 7:  High Risk, pharmacologic VTE prophylaxis is indicated for most patients (in the absence of a contraindication)

## 2021-03-25 NOTE — H&P PST ADULT - NSICDXFAMILYHX_GEN_ALL_CORE_FT
FAMILY HISTORY:  Father  Still living? No  Family history of colon cancer, Age at diagnosis: Age Unknown    Mother  Still living? Yes, Estimated age:   Family history of hypertension, Age at diagnosis: Age Unknown    Sibling  Still living? No  Family history of kidney disease, Age at diagnosis: Age Unknown  Family history of lung cancer, Age at diagnosis: Age Unknown

## 2021-03-25 NOTE — H&P PST ADULT - GASTROINTESTINAL COMMENTS
2 weeksd ago laparoscopic appendectomy , feels ok appetite good 2 weeks ago laparoscopic appendectomy , feels ok appetite good 2 weeks ago laparoscopic appendectomy , feels ok appetite good, h/o Hep c had treatment

## 2021-03-25 NOTE — H&P PST ADULT - NSICDXPROBLEM_GEN_ALL_CORE_FT
PROBLEM DIAGNOSES  Problem: Osteoarthritis of right knee  Assessment and Plan:  scheduled for revision of right total knee replacement on 3/8/21 with DR Cardona.   Medical eval  and labs done at PCP office and dental eal in chart.  Pre op instructions:  Hold OTC supplements. Medications reviewed for the week and morning of surgery.  NPO after 11pm to the morning of surgery.  Shower 2 days before and the morning of surgery with antibacterial soap as instructed.  Covid testing information given.  Patient verbalized understanding.  Is Jehovah witness, HCP and consent in chart.      Problem: Need for prophylactic measure  Assessment and Plan: The Caprini score indicates that this patient is at high risk for a VTE event (score 6 or greater). Surgical patients in this group will benefit from both pharmacologic prophylaxis and intermittent compression devices.  The surgical team will determine the balance between VTE risk and bleeding risk, and other clinical considerations

## 2021-03-25 NOTE — H&P PST ADULT - NSICDXPASTMEDICALHX_GEN_ALL_CORE_FT
PAST MEDICAL HISTORY:  Hayfever     Hepatitis C since 2002, took treatment 2014    HTN (hypertension)     HTN (hypertension)     Inguinal hernia left    Osteoarthritis

## 2021-03-25 NOTE — H&P PST ADULT - NSANTHOSAYNRD_GEN_A_CORE
No. KIMBER screening performed.  STOP BANG Legend: 0-2 = LOW Risk; 3-4 = INTERMEDIATE Risk; 5-8 = HIGH Risk

## 2021-03-25 NOTE — H&P PST ADULT - HISTORY OF PRESENT ILLNESS
69 y/o male, HTN, s/p right total knee replacement in 2012, c/o of severe pain in the right knee for 2 months. Difficulty walking , started using cane a few weeks ago. Went to Dr Cardona, was given tramadol for pain. Xray was done in the office, Now scheduled for revision of right total knee replacement. Pre op testing today.    Was in the ER 2 weeks ago with abdominal pain, diagnosed with appendicitis, s/p appendectomy 2 weeks ago, Denies covid exposure , was negative 2 weeks ago prior to surgery 69 y/o male, HTN, s/p right total knee replacement in 2012, c/o of severe pain in the right knee for 2 months. Difficulty walking , started using cane a few weeks ago. Went to Dr Cradona, was given tramadol for pain. Xray was done in the office, Now scheduled for revision of right total knee replacement. Pre op testing today.    Was in the ER 2 weeks ago with abdominal pain, diagnosed with appendicitis, s/p appendectomy 2 weeks ago,   Denies covid exposure , was negative 2 weeks ago prior to surgery.  Covid testing appointment on 4/5/21.

## 2021-03-29 LAB
CRP SERPL-MCNC: <3 MG/L — SIGNIFICANT CHANGE UP (ref 0–4)
ERYTHROCYTE [SEDIMENTATION RATE] IN BLOOD: 41 MM/HR — HIGH (ref 0–20)

## 2021-04-05 ENCOUNTER — OUTPATIENT (OUTPATIENT)
Dept: OUTPATIENT SERVICES | Facility: HOSPITAL | Age: 71
LOS: 1 days | End: 2021-04-05
Payer: MEDICARE

## 2021-04-05 DIAGNOSIS — Z90.49 ACQUIRED ABSENCE OF OTHER SPECIFIED PARTS OF DIGESTIVE TRACT: Chronic | ICD-10-CM

## 2021-04-05 DIAGNOSIS — Z96.659 PRESENCE OF UNSPECIFIED ARTIFICIAL KNEE JOINT: Chronic | ICD-10-CM

## 2021-04-05 DIAGNOSIS — Z11.52 ENCOUNTER FOR SCREENING FOR COVID-19: ICD-10-CM

## 2021-04-05 DIAGNOSIS — H26.9 UNSPECIFIED CATARACT: Chronic | ICD-10-CM

## 2021-04-05 DIAGNOSIS — K40.90 UNILATERAL INGUINAL HERNIA, WITHOUT OBSTRUCTION OR GANGRENE, NOT SPECIFIED AS RECURRENT: Chronic | ICD-10-CM

## 2021-04-05 LAB — SARS-COV-2 RNA SPEC QL NAA+PROBE: SIGNIFICANT CHANGE UP

## 2021-04-05 PROCEDURE — U0003: CPT

## 2021-04-05 PROCEDURE — U0005: CPT

## 2021-04-05 PROCEDURE — C9803: CPT

## 2021-04-07 ENCOUNTER — TRANSCRIPTION ENCOUNTER (OUTPATIENT)
Age: 71
End: 2021-04-07

## 2021-04-08 ENCOUNTER — INPATIENT (INPATIENT)
Facility: HOSPITAL | Age: 71
LOS: 1 days | Discharge: HOME | DRG: 468 | End: 2021-04-10
Attending: ORTHOPAEDIC SURGERY | Admitting: ORTHOPAEDIC SURGERY
Payer: MEDICARE

## 2021-04-08 ENCOUNTER — APPOINTMENT (OUTPATIENT)
Dept: ORTHOPEDIC SURGERY | Facility: HOSPITAL | Age: 71
End: 2021-04-08

## 2021-04-08 VITALS
OXYGEN SATURATION: 97 % | SYSTOLIC BLOOD PRESSURE: 145 MMHG | WEIGHT: 195.11 LBS | DIASTOLIC BLOOD PRESSURE: 93 MMHG | HEART RATE: 82 BPM | HEIGHT: 74 IN | RESPIRATION RATE: 16 BRPM | TEMPERATURE: 98 F

## 2021-04-08 DIAGNOSIS — H26.9 UNSPECIFIED CATARACT: Chronic | ICD-10-CM

## 2021-04-08 DIAGNOSIS — Z90.49 ACQUIRED ABSENCE OF OTHER SPECIFIED PARTS OF DIGESTIVE TRACT: Chronic | ICD-10-CM

## 2021-04-08 DIAGNOSIS — K40.90 UNILATERAL INGUINAL HERNIA, WITHOUT OBSTRUCTION OR GANGRENE, NOT SPECIFIED AS RECURRENT: Chronic | ICD-10-CM

## 2021-04-08 DIAGNOSIS — Z96.659 PRESENCE OF UNSPECIFIED ARTIFICIAL KNEE JOINT: Chronic | ICD-10-CM

## 2021-04-08 DIAGNOSIS — T84.032A MECHANICAL LOOSENING OF INTERNAL RIGHT KNEE PROSTHETIC JOINT, INITIAL ENCOUNTER: ICD-10-CM

## 2021-04-08 LAB — CRP SERPL-MCNC: 5 MG/L — HIGH (ref 0–4)

## 2021-04-08 PROCEDURE — 73560 X-RAY EXAM OF KNEE 1 OR 2: CPT | Mod: 26,RT

## 2021-04-08 PROCEDURE — 27487 REVISE/REPLACE KNEE JOINT: CPT | Mod: RT

## 2021-04-08 RX ORDER — DEXAMETHASONE 0.5 MG/5ML
8 ELIXIR ORAL ONCE
Refills: 0 | Status: COMPLETED | OUTPATIENT
Start: 2021-04-09 | End: 2021-04-09

## 2021-04-08 RX ORDER — SODIUM CHLORIDE 9 MG/ML
500 INJECTION, SOLUTION INTRAVENOUS ONCE
Refills: 0 | Status: COMPLETED | OUTPATIENT
Start: 2021-04-08 | End: 2021-04-09

## 2021-04-08 RX ORDER — AMLODIPINE BESYLATE 2.5 MG/1
10 TABLET ORAL DAILY
Refills: 0 | Status: DISCONTINUED | OUTPATIENT
Start: 2021-04-08 | End: 2021-04-10

## 2021-04-08 RX ORDER — FERROUS SULFATE 325(65) MG
325 TABLET ORAL DAILY
Refills: 0 | Status: DISCONTINUED | OUTPATIENT
Start: 2021-04-08 | End: 2021-04-10

## 2021-04-08 RX ORDER — HYDROMORPHONE HYDROCHLORIDE 2 MG/ML
0.5 INJECTION INTRAMUSCULAR; INTRAVENOUS; SUBCUTANEOUS
Refills: 0 | Status: DISCONTINUED | OUTPATIENT
Start: 2021-04-08 | End: 2021-04-08

## 2021-04-08 RX ORDER — HYDROMORPHONE HYDROCHLORIDE 2 MG/ML
0.5 INJECTION INTRAMUSCULAR; INTRAVENOUS; SUBCUTANEOUS ONCE
Refills: 0 | Status: DISCONTINUED | OUTPATIENT
Start: 2021-04-08 | End: 2021-04-10

## 2021-04-08 RX ORDER — POLYETHYLENE GLYCOL 3350 17 G/17G
17 POWDER, FOR SOLUTION ORAL AT BEDTIME
Refills: 0 | Status: DISCONTINUED | OUTPATIENT
Start: 2021-04-08 | End: 2021-04-10

## 2021-04-08 RX ORDER — APIXABAN 2.5 MG/1
2.5 TABLET, FILM COATED ORAL
Refills: 0 | Status: DISCONTINUED | OUTPATIENT
Start: 2021-04-08 | End: 2021-04-08

## 2021-04-08 RX ORDER — OXYCODONE HYDROCHLORIDE 5 MG/1
10 TABLET ORAL ONCE
Refills: 0 | Status: DISCONTINUED | OUTPATIENT
Start: 2021-04-08 | End: 2021-04-08

## 2021-04-08 RX ORDER — SENNA PLUS 8.6 MG/1
2 TABLET ORAL AT BEDTIME
Refills: 0 | Status: DISCONTINUED | OUTPATIENT
Start: 2021-04-08 | End: 2021-04-10

## 2021-04-08 RX ORDER — SODIUM CHLORIDE 9 MG/ML
500 INJECTION, SOLUTION INTRAVENOUS ONCE
Refills: 0 | Status: COMPLETED | OUTPATIENT
Start: 2021-04-08 | End: 2021-04-08

## 2021-04-08 RX ORDER — ONDANSETRON 8 MG/1
4 TABLET, FILM COATED ORAL EVERY 6 HOURS
Refills: 0 | Status: DISCONTINUED | OUTPATIENT
Start: 2021-04-08 | End: 2021-04-10

## 2021-04-08 RX ORDER — CHLORHEXIDINE GLUCONATE 213 G/1000ML
1 SOLUTION TOPICAL ONCE
Refills: 0 | Status: COMPLETED | OUTPATIENT
Start: 2021-04-08 | End: 2021-04-08

## 2021-04-08 RX ORDER — OXYCODONE HYDROCHLORIDE 5 MG/1
10 TABLET ORAL
Refills: 0 | Status: DISCONTINUED | OUTPATIENT
Start: 2021-04-08 | End: 2021-04-10

## 2021-04-08 RX ORDER — SODIUM CHLORIDE 9 MG/ML
500 INJECTION INTRAMUSCULAR; INTRAVENOUS; SUBCUTANEOUS ONCE
Refills: 0 | Status: COMPLETED | OUTPATIENT
Start: 2021-04-08 | End: 2021-04-08

## 2021-04-08 RX ORDER — KETOROLAC TROMETHAMINE 30 MG/ML
15 SYRINGE (ML) INJECTION EVERY 6 HOURS
Refills: 0 | Status: DISCONTINUED | OUTPATIENT
Start: 2021-04-08 | End: 2021-04-09

## 2021-04-08 RX ORDER — AMLODIPINE BESYLATE 2.5 MG/1
0 TABLET ORAL
Qty: 0 | Refills: 0 | DISCHARGE

## 2021-04-08 RX ORDER — APIXABAN 2.5 MG/1
2.5 TABLET, FILM COATED ORAL
Refills: 0 | Status: DISCONTINUED | OUTPATIENT
Start: 2021-04-09 | End: 2021-04-10

## 2021-04-08 RX ORDER — ASCORBIC ACID 60 MG
500 TABLET,CHEWABLE ORAL
Refills: 0 | Status: DISCONTINUED | OUTPATIENT
Start: 2021-04-08 | End: 2021-04-10

## 2021-04-08 RX ORDER — TRAMADOL HYDROCHLORIDE 50 MG/1
50 TABLET ORAL EVERY 6 HOURS
Refills: 0 | Status: DISCONTINUED | OUTPATIENT
Start: 2021-04-08 | End: 2021-04-10

## 2021-04-08 RX ORDER — SODIUM CHLORIDE 9 MG/ML
1000 INJECTION, SOLUTION INTRAVENOUS
Refills: 0 | Status: DISCONTINUED | OUTPATIENT
Start: 2021-04-08 | End: 2021-04-10

## 2021-04-08 RX ORDER — MAGNESIUM HYDROXIDE 400 MG/1
30 TABLET, CHEWABLE ORAL DAILY
Refills: 0 | Status: DISCONTINUED | OUTPATIENT
Start: 2021-04-08 | End: 2021-04-10

## 2021-04-08 RX ORDER — CEFAZOLIN SODIUM 1 G
2000 VIAL (EA) INJECTION EVERY 8 HOURS
Refills: 0 | Status: COMPLETED | OUTPATIENT
Start: 2021-04-08 | End: 2021-04-09

## 2021-04-08 RX ORDER — TRAMADOL HYDROCHLORIDE 50 MG/1
50 TABLET ORAL ONCE
Refills: 0 | Status: DISCONTINUED | OUTPATIENT
Start: 2021-04-08 | End: 2021-04-08

## 2021-04-08 RX ORDER — ONDANSETRON 8 MG/1
4 TABLET, FILM COATED ORAL EVERY 4 HOURS
Refills: 0 | Status: DISCONTINUED | OUTPATIENT
Start: 2021-04-08 | End: 2021-04-08

## 2021-04-08 RX ORDER — OXYCODONE HYDROCHLORIDE 5 MG/1
5 TABLET ORAL ONCE
Refills: 0 | Status: DISCONTINUED | OUTPATIENT
Start: 2021-04-08 | End: 2021-04-08

## 2021-04-08 RX ORDER — OXYCODONE HYDROCHLORIDE 5 MG/1
5 TABLET ORAL
Refills: 0 | Status: DISCONTINUED | OUTPATIENT
Start: 2021-04-08 | End: 2021-04-10

## 2021-04-08 RX ORDER — FOLIC ACID 0.8 MG
1 TABLET ORAL DAILY
Refills: 0 | Status: DISCONTINUED | OUTPATIENT
Start: 2021-04-08 | End: 2021-04-10

## 2021-04-08 RX ORDER — CELECOXIB 200 MG/1
200 CAPSULE ORAL EVERY 12 HOURS
Refills: 0 | Status: DISCONTINUED | OUTPATIENT
Start: 2021-04-10 | End: 2021-04-10

## 2021-04-08 RX ORDER — ACETAMINOPHEN 500 MG
1000 TABLET ORAL EVERY 8 HOURS
Refills: 0 | Status: DISCONTINUED | OUTPATIENT
Start: 2021-04-08 | End: 2021-04-10

## 2021-04-08 RX ORDER — PANTOPRAZOLE SODIUM 20 MG/1
40 TABLET, DELAYED RELEASE ORAL
Refills: 0 | Status: DISCONTINUED | OUTPATIENT
Start: 2021-04-08 | End: 2021-04-10

## 2021-04-08 RX ADMIN — Medication 150 MILLIGRAM(S): at 14:15

## 2021-04-08 RX ADMIN — HYDROMORPHONE HYDROCHLORIDE 0.5 MILLIGRAM(S): 2 INJECTION INTRAMUSCULAR; INTRAVENOUS; SUBCUTANEOUS at 20:45

## 2021-04-08 RX ADMIN — CHLORHEXIDINE GLUCONATE 1 APPLICATION(S): 213 SOLUTION TOPICAL at 14:15

## 2021-04-08 RX ADMIN — PANTOPRAZOLE SODIUM 40 MILLIGRAM(S): 20 TABLET, DELAYED RELEASE ORAL at 14:15

## 2021-04-08 RX ADMIN — SODIUM CHLORIDE 75 MILLILITER(S): 9 INJECTION, SOLUTION INTRAVENOUS at 21:25

## 2021-04-08 RX ADMIN — SODIUM CHLORIDE 1000 MILLILITER(S): 9 INJECTION INTRAMUSCULAR; INTRAVENOUS; SUBCUTANEOUS at 22:57

## 2021-04-08 RX ADMIN — OXYCODONE HYDROCHLORIDE 10 MILLIGRAM(S): 5 TABLET ORAL at 21:30

## 2021-04-08 RX ADMIN — OXYCODONE HYDROCHLORIDE 10 MILLIGRAM(S): 5 TABLET ORAL at 20:45

## 2021-04-08 RX ADMIN — TRAMADOL HYDROCHLORIDE 50 MILLIGRAM(S): 50 TABLET ORAL at 14:15

## 2021-04-08 RX ADMIN — SODIUM CHLORIDE 500 MILLILITER(S): 9 INJECTION, SOLUTION INTRAVENOUS at 20:45

## 2021-04-08 RX ADMIN — HYDROMORPHONE HYDROCHLORIDE 0.5 MILLIGRAM(S): 2 INJECTION INTRAMUSCULAR; INTRAVENOUS; SUBCUTANEOUS at 20:25

## 2021-04-08 NOTE — PRE-OP CHECKLIST - 2.
Blood refusal products refusal huddle performed qith patient and wife-- Dr. Cardona, Dr. Rocha, and OR manager at bedside.

## 2021-04-08 NOTE — CHART NOTE - NSCHARTNOTEFT_GEN_A_CORE
Patient seen on 7 tower. Resting without complaints.  No Chest Pain, SOB, N/V.    T(C): 36.4 (04-08-21 @ 22:55), Max: 36.7 (04-08-21 @ 12:59)  HR: 80 (04-08-21 @ 22:55) (66 - 84)  BP: 131/85 (04-08-21 @ 22:55) (107/72 - 145/93)  RR: 18 (04-08-21 @ 22:55) (14 - 18)  SpO2: 95% (04-08-21 @ 22:55) (94% - 100%)  Wt(kg): --    Exam:  Alert and [place], No Acute Distress  Card: +S1/S2, RRR  Pulm: CTAB  Laterality: Right knee immobilizer in place; ace bandage c/d/i  Prevena vac intact  Calves soft, non-tender bilaterally  +PF/DF/EHL/FHL  SILT  + DP pulse    Xray: Post-op xray in chart             A/P: Patient is a 70y Male S/p Revision of Right TKA. VSS. NAD  -PT/OT-WBAT to RLE with Knee immobilizer until Saturday, 4/10/21.  -IS encouraged  -DVT PPx - Eliqiuis 2.5 mg BID x 4 weeks  -Pain Control PRN  -OOB to chair in AM  -D/C Prevena vac on sunday, if cleared by PT prior to Sunday, may be d/c'd with Prevena and to follow up with Dr. Cardona in the office for vac removal/dressing change on Wed 4/14/21.  -FU AM Labs    Lien Jaffe PA-C  Team Pager #[phone]

## 2021-04-08 NOTE — PRE-ANESTHESIA EVALUATION ADULT - NSANTHPMHFT_GEN_ALL_CORE
70 M w/ HTN, Hep C s/p treatment, recent lap mariah, [employer], s/p right total knee replacement in 2012, c/o of severe pain in the right knee for 2 months, difficulty walking , scheduled for revision of right total knee replacement

## 2021-04-09 LAB
ANION GAP SERPL CALC-SCNC: 11 MMOL/L — SIGNIFICANT CHANGE UP (ref 5–17)
BUN SERPL-MCNC: 15 MG/DL — SIGNIFICANT CHANGE UP (ref 7–23)
CALCIUM SERPL-MCNC: 8.7 MG/DL — SIGNIFICANT CHANGE UP (ref 8.4–10.5)
CHLORIDE SERPL-SCNC: 102 MMOL/L — SIGNIFICANT CHANGE UP (ref 96–108)
CO2 SERPL-SCNC: 23 MMOL/L — SIGNIFICANT CHANGE UP (ref 22–31)
CREAT SERPL-MCNC: 0.93 MG/DL — SIGNIFICANT CHANGE UP (ref 0.5–1.3)
GLUCOSE SERPL-MCNC: 128 MG/DL — HIGH (ref 70–99)
GRAM STN FLD: SIGNIFICANT CHANGE UP
HCT VFR BLD CALC: 38.9 % — LOW (ref 39–50)
HGB BLD-MCNC: 11.5 G/DL — LOW (ref 13–17)
MCHC RBC-ENTMCNC: 20.7 PG — LOW (ref 27–34)
MCHC RBC-ENTMCNC: 29.6 GM/DL — LOW (ref 32–36)
MCV RBC AUTO: 70.1 FL — LOW (ref 80–100)
NIGHT BLUE STAIN TISS: SIGNIFICANT CHANGE UP
NRBC # BLD: 0 /100 WBCS — SIGNIFICANT CHANGE UP (ref 0–0)
PLATELET # BLD AUTO: 196 K/UL — SIGNIFICANT CHANGE UP (ref 150–400)
POTASSIUM SERPL-MCNC: 4.4 MMOL/L — SIGNIFICANT CHANGE UP (ref 3.5–5.3)
POTASSIUM SERPL-SCNC: 4.4 MMOL/L — SIGNIFICANT CHANGE UP (ref 3.5–5.3)
RBC # BLD: 5.55 M/UL — SIGNIFICANT CHANGE UP (ref 4.2–5.8)
RBC # FLD: 17 % — HIGH (ref 10.3–14.5)
SODIUM SERPL-SCNC: 136 MMOL/L — SIGNIFICANT CHANGE UP (ref 135–145)
SPECIMEN SOURCE: SIGNIFICANT CHANGE UP
WBC # BLD: 8.13 K/UL — SIGNIFICANT CHANGE UP (ref 3.8–10.5)
WBC # FLD AUTO: 8.13 K/UL — SIGNIFICANT CHANGE UP (ref 3.8–10.5)

## 2021-04-09 RX ORDER — BENZOCAINE AND MENTHOL 5; 1 G/100ML; G/100ML
1 LIQUID ORAL
Refills: 0 | Status: DISCONTINUED | OUTPATIENT
Start: 2021-04-09 | End: 2021-04-10

## 2021-04-09 RX ORDER — ACETAMINOPHEN 500 MG
1000 TABLET ORAL EVERY 8 HOURS
Refills: 0 | Status: COMPLETED | OUTPATIENT
Start: 2021-04-09 | End: 2021-04-09

## 2021-04-09 RX ADMIN — OXYCODONE HYDROCHLORIDE 10 MILLIGRAM(S): 5 TABLET ORAL at 00:08

## 2021-04-09 RX ADMIN — APIXABAN 2.5 MILLIGRAM(S): 2.5 TABLET, FILM COATED ORAL at 18:21

## 2021-04-09 RX ADMIN — APIXABAN 2.5 MILLIGRAM(S): 2.5 TABLET, FILM COATED ORAL at 05:29

## 2021-04-09 RX ADMIN — Medication 1 TABLET(S): at 11:13

## 2021-04-09 RX ADMIN — OXYCODONE HYDROCHLORIDE 10 MILLIGRAM(S): 5 TABLET ORAL at 07:48

## 2021-04-09 RX ADMIN — Medication 500 MILLIGRAM(S): at 05:29

## 2021-04-09 RX ADMIN — POLYETHYLENE GLYCOL 3350 17 GRAM(S): 17 POWDER, FOR SOLUTION ORAL at 21:29

## 2021-04-09 RX ADMIN — Medication 15 MILLIGRAM(S): at 11:30

## 2021-04-09 RX ADMIN — ONDANSETRON 4 MILLIGRAM(S): 8 TABLET, FILM COATED ORAL at 00:09

## 2021-04-09 RX ADMIN — Medication 15 MILLIGRAM(S): at 18:21

## 2021-04-09 RX ADMIN — Medication 15 MILLIGRAM(S): at 05:30

## 2021-04-09 RX ADMIN — OXYCODONE HYDROCHLORIDE 10 MILLIGRAM(S): 5 TABLET ORAL at 22:00

## 2021-04-09 RX ADMIN — SODIUM CHLORIDE 500 MILLILITER(S): 9 INJECTION, SOLUTION INTRAVENOUS at 06:08

## 2021-04-09 RX ADMIN — Medication 15 MILLIGRAM(S): at 00:38

## 2021-04-09 RX ADMIN — BENZOCAINE AND MENTHOL 1 LOZENGE: 5; 1 LIQUID ORAL at 11:13

## 2021-04-09 RX ADMIN — Medication 101.6 MILLIGRAM(S): at 05:30

## 2021-04-09 RX ADMIN — Medication 100 MILLIGRAM(S): at 00:07

## 2021-04-09 RX ADMIN — Medication 500 MILLIGRAM(S): at 18:21

## 2021-04-09 RX ADMIN — Medication 400 MILLIGRAM(S): at 12:13

## 2021-04-09 RX ADMIN — Medication 1000 MILLIGRAM(S): at 04:20

## 2021-04-09 RX ADMIN — OXYCODONE HYDROCHLORIDE 10 MILLIGRAM(S): 5 TABLET ORAL at 21:29

## 2021-04-09 RX ADMIN — Medication 325 MILLIGRAM(S): at 11:13

## 2021-04-09 RX ADMIN — Medication 1000 MILLIGRAM(S): at 12:30

## 2021-04-09 RX ADMIN — Medication 15 MILLIGRAM(S): at 00:07

## 2021-04-09 RX ADMIN — Medication 1 MILLIGRAM(S): at 11:13

## 2021-04-09 RX ADMIN — Medication 100 MILLIGRAM(S): at 07:44

## 2021-04-09 RX ADMIN — OXYCODONE HYDROCHLORIDE 10 MILLIGRAM(S): 5 TABLET ORAL at 08:30

## 2021-04-09 RX ADMIN — AMLODIPINE BESYLATE 10 MILLIGRAM(S): 2.5 TABLET ORAL at 05:29

## 2021-04-09 RX ADMIN — Medication 15 MILLIGRAM(S): at 11:12

## 2021-04-09 RX ADMIN — ONDANSETRON 4 MILLIGRAM(S): 8 TABLET, FILM COATED ORAL at 07:49

## 2021-04-09 RX ADMIN — PANTOPRAZOLE SODIUM 40 MILLIGRAM(S): 20 TABLET, DELAYED RELEASE ORAL at 05:29

## 2021-04-09 RX ADMIN — SENNA PLUS 2 TABLET(S): 8.6 TABLET ORAL at 21:29

## 2021-04-09 RX ADMIN — OXYCODONE HYDROCHLORIDE 10 MILLIGRAM(S): 5 TABLET ORAL at 00:38

## 2021-04-09 RX ADMIN — Medication 400 MILLIGRAM(S): at 03:50

## 2021-04-09 NOTE — PHYSICAL THERAPY INITIAL EVALUATION ADULT - GAIT DEVIATIONS NOTED, PT EVAL
decreased carlie/decreased velocity of limb motion/decreased step length/decreased weight-shifting ability

## 2021-04-09 NOTE — PHYSICAL THERAPY INITIAL EVALUATION ADULT - PERTINENT HX OF CURRENT PROBLEM, REHAB EVAL
Pt is a 69 y/o male admitted to [facility] on 4/8/21  s/p right total knee replacement in 2012, c/o of severe pain in the right knee for 2 months. Difficulty walking , started using cane a few weeks ago. Pt was in the ER 2 weeks ago with abdominal pain, diagnosed with appendicitis, s/p appendectomy 2 weeks ago.  Pt is now s/p R TKA

## 2021-04-09 NOTE — PROGRESS NOTE ADULT - SUBJECTIVE AND OBJECTIVE BOX
Orthopedics      Patient seen and examined at bedside. Feeling well. Pain controlled. No n/v. No acute events overnight.    Vital Signs Last 24 Hrs  T(C): 36.6 (04-09-21 @ 04:35), Max: 36.7 (04-08-21 @ 12:59)  T(F): 97.8 (04-09-21 @ 04:35), Max: 98.1 (04-08-21 @ 12:59)  HR: 83 (04-09-21 @ 04:35) (66 - 88)  BP: 132/91 (04-09-21 @ 04:35) (107/72 - 146/90)  BP(mean): 90 (04-08-21 @ 21:30) (85 - 100)  RR: 18 (04-09-21 @ 04:35) (14 - 18)  SpO2: 96% (04-09-21 @ 04:35) (94% - 100%)                        11.5   8.13  )-----------( 196      ( 09 Apr 2021 06:15 )             38.9               Exam:  Gen: NAD, resting comfortably  RLE:  Dressing c/d/i in   NTTP calves b/l  +EHL/FHL/TA/GS  SILT L2-S1  Compartments soft and compressible  2+ Pulses palpable      A/P: 70yM s/p revision right TKA POD 1    -Pain control  -WEIGHT BEARING as tolerated in [facility] until Saturday  -Preevena dressing until Sunday, may be DC with preevena if passes PT saturday  -DVT ppx Karena  -Incentive Spirometry  -dispo planning pending PT  -ortho stable Orthopedics      Patient seen and examined at bedside. Feeling well. Pain controlled. No n/v. No acute events overnight.    Vital Signs Last 24 Hrs  T(C): 36.6 (04-09-21 @ 04:35), Max: 36.7 (04-08-21 @ 12:59)  T(F): 97.8 (04-09-21 @ 04:35), Max: 98.1 (04-08-21 @ 12:59)  HR: 83 (04-09-21 @ 04:35) (66 - 88)  BP: 132/91 (04-09-21 @ 04:35) (107/72 - 146/90)  BP(mean): 90 (04-08-21 @ 21:30) (85 - 100)  RR: 18 (04-09-21 @ 04:35) (14 - 18)  SpO2: 96% (04-09-21 @ 04:35) (94% - 100%)                        11.5   8.13  )-----------( 196      ( 09 Apr 2021 06:15 )             38.9               Exam:  Gen: NAD, resting comfortably  RLE:  Dressing c/d/i in   NTTP calves b/l  +EHL/FHL/TA/GS  SILT L2-S1  Compartments soft and compressible  2+ Pulses palpable      A/P: 70yM s/p revision right TKA POD 1    -Pain control  -WEIGHT BEARING as tolerated in [facility] until Saturday  -Preevena dressing until Sunday, may be DC with preevena if passes PT saturday  -DVT ppx Karena  -JW hold blood products  -Incentive Spirometry  -dispo planning pending PT  -ortho stable

## 2021-04-09 NOTE — PROGRESS NOTE ADULT - ATTENDING COMMENTS
I agree with the above note and have personally seen and examined this patient. All pertinent films have been reviewed. Please refer to clinical documentation of the history, physical examinations, data summary, and both assessment and plan as documented above and with which I agree.    KI until Saturday  if clears PT Sat then may dc home, otherwise Sunday plan  ivac   ancef x 24  wbat  latonia Cardona MD  Attending Orthopedic Surgeon

## 2021-04-09 NOTE — PHYSICAL THERAPY INITIAL EVALUATION ADULT - ADDITIONAL COMMENTS
Pt lives in a private house with spouse with one flight of steps inside. Pt was Ind with all ADLs and amb with SC x 2 weeks

## 2021-04-09 NOTE — OCCUPATIONAL THERAPY INITIAL EVALUATION ADULT - PERTINENT HX OF CURRENT PROBLEM, REHAB EVAL
Pt is a 69 y/o male admitted to [facility] on 4/8/21  s/p right total knee replacement in 2012, c/o of severe pain in the right knee for 2 months. Difficulty walking , started using cane a few weeks ago. Pt was in the ER 2 weeks ago with abdominal pain, diagnosed with appendicitis, s/p appendectomy 2 weeks ago.  Pt is now s/p R TKA

## 2021-04-09 NOTE — PHYSICAL THERAPY INITIAL EVALUATION ADULT - PLANNED THERAPY INTERVENTIONS, PT EVAL
stair negotiation: GOAL: Pt will be able to negotiate 10 steps +HR independently with reciprocal pattern in 2 weeks./bed mobility training/gait training/ROM/transfer training

## 2021-04-09 NOTE — CONSULT NOTE ADULT - ASSESSMENT
Patient is a 71 y/o male, HTN, s/p right total knee replacement  Patient is a 69 y/o male, HTN, s/p right total knee replacement     # S/P total knee  # HTN     care as per Ortho   PT as tolerated  pain control   IV hydration  BP management  on norvasc 10  if uncontrolled , can add another BP agent, likely lisinopril 10 for better BP control   DVT and gI PPX

## 2021-04-10 ENCOUNTER — TRANSCRIPTION ENCOUNTER (OUTPATIENT)
Age: 71
End: 2021-04-10

## 2021-04-10 VITALS
DIASTOLIC BLOOD PRESSURE: 83 MMHG | OXYGEN SATURATION: 93 % | RESPIRATION RATE: 17 BRPM | HEART RATE: 86 BPM | SYSTOLIC BLOOD PRESSURE: 149 MMHG | TEMPERATURE: 98 F

## 2021-04-10 DIAGNOSIS — M19.90 UNSPECIFIED OSTEOARTHRITIS, UNSPECIFIED SITE: ICD-10-CM

## 2021-04-10 PROCEDURE — 87102 FUNGUS ISOLATION CULTURE: CPT

## 2021-04-10 PROCEDURE — 86140 C-REACTIVE PROTEIN: CPT

## 2021-04-10 PROCEDURE — 82962 GLUCOSE BLOOD TEST: CPT

## 2021-04-10 PROCEDURE — 87206 SMEAR FLUORESCENT/ACID STAI: CPT

## 2021-04-10 PROCEDURE — 86900 BLOOD TYPING SEROLOGIC ABO: CPT

## 2021-04-10 PROCEDURE — 97165 OT EVAL LOW COMPLEX 30 MIN: CPT

## 2021-04-10 PROCEDURE — 73560 X-RAY EXAM OF KNEE 1 OR 2: CPT

## 2021-04-10 PROCEDURE — 97116 GAIT TRAINING THERAPY: CPT

## 2021-04-10 PROCEDURE — 80048 BASIC METABOLIC PNL TOTAL CA: CPT

## 2021-04-10 PROCEDURE — 85027 COMPLETE CBC AUTOMATED: CPT

## 2021-04-10 PROCEDURE — 97530 THERAPEUTIC ACTIVITIES: CPT

## 2021-04-10 PROCEDURE — 87116 MYCOBACTERIA CULTURE: CPT

## 2021-04-10 PROCEDURE — C1776: CPT

## 2021-04-10 PROCEDURE — 87205 SMEAR GRAM STAIN: CPT

## 2021-04-10 PROCEDURE — C1713: CPT

## 2021-04-10 PROCEDURE — 86901 BLOOD TYPING SEROLOGIC RH(D): CPT

## 2021-04-10 PROCEDURE — 97161 PT EVAL LOW COMPLEX 20 MIN: CPT

## 2021-04-10 PROCEDURE — 87015 SPECIMEN INFECT AGNT CONCNTJ: CPT

## 2021-04-10 PROCEDURE — 87070 CULTURE OTHR SPECIMN AEROBIC: CPT

## 2021-04-10 PROCEDURE — 86850 RBC ANTIBODY SCREEN: CPT

## 2021-04-10 PROCEDURE — 87075 CULTR BACTERIA EXCEPT BLOOD: CPT

## 2021-04-10 RX ORDER — PANTOPRAZOLE SODIUM 20 MG/1
1 TABLET, DELAYED RELEASE ORAL
Qty: 28 | Refills: 0
Start: 2021-04-10 | End: 2021-05-07

## 2021-04-10 RX ORDER — APIXABAN 2.5 MG/1
1 TABLET, FILM COATED ORAL
Qty: 56 | Refills: 0
Start: 2021-04-10 | End: 2021-05-07

## 2021-04-10 RX ORDER — TRAMADOL HYDROCHLORIDE 50 MG/1
1 TABLET ORAL
Qty: 28 | Refills: 0
Start: 2021-04-10 | End: 2021-04-16

## 2021-04-10 RX ORDER — OXYCODONE HYDROCHLORIDE 5 MG/1
1 TABLET ORAL
Qty: 42 | Refills: 0
Start: 2021-04-10 | End: 2021-04-16

## 2021-04-10 RX ORDER — ACETAMINOPHEN 500 MG
1000 TABLET ORAL ONCE
Refills: 0 | Status: COMPLETED | OUTPATIENT
Start: 2021-04-10 | End: 2021-04-10

## 2021-04-10 RX ADMIN — Medication 500 MILLIGRAM(S): at 05:12

## 2021-04-10 RX ADMIN — CELECOXIB 200 MILLIGRAM(S): 200 CAPSULE ORAL at 05:12

## 2021-04-10 RX ADMIN — OXYCODONE HYDROCHLORIDE 10 MILLIGRAM(S): 5 TABLET ORAL at 09:04

## 2021-04-10 RX ADMIN — Medication 1000 MILLIGRAM(S): at 12:27

## 2021-04-10 RX ADMIN — OXYCODONE HYDROCHLORIDE 10 MILLIGRAM(S): 5 TABLET ORAL at 05:12

## 2021-04-10 RX ADMIN — Medication 1 TABLET(S): at 11:18

## 2021-04-10 RX ADMIN — AMLODIPINE BESYLATE 10 MILLIGRAM(S): 2.5 TABLET ORAL at 05:12

## 2021-04-10 RX ADMIN — CELECOXIB 200 MILLIGRAM(S): 200 CAPSULE ORAL at 05:42

## 2021-04-10 RX ADMIN — Medication 325 MILLIGRAM(S): at 11:18

## 2021-04-10 RX ADMIN — Medication 1 MILLIGRAM(S): at 11:18

## 2021-04-10 RX ADMIN — Medication 400 MILLIGRAM(S): at 11:18

## 2021-04-10 RX ADMIN — APIXABAN 2.5 MILLIGRAM(S): 2.5 TABLET, FILM COATED ORAL at 05:12

## 2021-04-10 RX ADMIN — OXYCODONE HYDROCHLORIDE 10 MILLIGRAM(S): 5 TABLET ORAL at 09:35

## 2021-04-10 RX ADMIN — PANTOPRAZOLE SODIUM 40 MILLIGRAM(S): 20 TABLET, DELAYED RELEASE ORAL at 05:11

## 2021-04-10 RX ADMIN — OXYCODONE HYDROCHLORIDE 10 MILLIGRAM(S): 5 TABLET ORAL at 05:42

## 2021-04-10 NOTE — PROGRESS NOTE ADULT - SUBJECTIVE AND OBJECTIVE BOX
Name of Patient : MATTY FUCHS  MRN: [id number]  DATE OF SERVICE: 04-10-21 @ 22:32    Subjective: Patient seen and examined. No new events except as noted.     REVIEW OF SYSTEMS:    CONSTITUTIONAL: No weakness, fevers or chills  EYES/ENT: No visual changes;  No vertigo or throat pain   NECK: No pain or stiffness  RESPIRATORY: No cough, wheezing, hemoptysis; No shortness of breath  CARDIOVASCULAR: No chest pain or palpitations  GASTROINTESTINAL: No abdominal or epigastric pain. No nausea, vomiting, or hematemesis; No diarrhea or constipation. No melena or hematochezia.  GENITOURINARY: No dysuria, frequency or hematuria  NEUROLOGICAL: No numbness or weakness  SKIN: No itching, burning, rashes, or lesions   All other review of systems is negative unless indicated above.    MEDICATIONS:  MEDICATIONS  (STANDING):  acetaminophen  IVPB .. 1000 milliGRAM(s) IV Intermittent every 8 hours  amLODIPine   Tablet 10 milliGRAM(s) Oral daily  apixaban 2.5 milliGRAM(s) Oral two times a day  ascorbic acid 500 milliGRAM(s) Oral two times a day  benzocaine 15 mG/menthol 3.6 mG (Sugar-Free) Lozenge 1 Lozenge Oral every 3 hours  ceFAZolin   IVPB 2000 milliGRAM(s) IV Intermittent once  celecoxib 200 milliGRAM(s) Oral every 12 hours  ferrous    sulfate 325 milliGRAM(s) Oral daily  folic acid 1 milliGRAM(s) Oral daily  HYDROmorphone  Injectable 0.5 milliGRAM(s) IV Push once  lactated ringers. 1000 milliLiter(s) (75 mL/Hr) IV Continuous <Continuous>  multivitamin 1 Tablet(s) Oral daily  pantoprazole    Tablet 40 milliGRAM(s) Oral before breakfast  polyethylene glycol 3350 17 Gram(s) Oral at bedtime  senna 2 Tablet(s) Oral at bedtime      PHYSICAL EXAM:  T(C): 36.6 (04-10-21 @ 12:26), Max: 37.2 (04-09-21 @ 23:56)  HR: 86 (04-10-21 @ 12:26) (80 - 86)  BP: 149/83 (04-10-21 @ 12:26) (122/76 - 149/83)  RR: 17 (04-10-21 @ 12:26) (17 - 18)  SpO2: 93% (04-10-21 @ 12:26) (93% - 99%)  Wt(kg): --  I&O's Summary    09 Apr 2021 07:01  -  10 Apr 2021 07:00  --------------------------------------------------------  IN: 1364 mL / OUT: 2750 mL / NET: -1386 mL    10 Apr 2021 07:01  -  10 Apr 2021 22:32  --------------------------------------------------------  IN: 480 mL / OUT: 0 mL / NET: 480 mL          Appearance: Normal	  HEENT:  PERRLA   Lymphatic: No lymphadenopathy   Cardiovascular: Normal S1 S2, no JVD  Respiratory: normal effort , clear  Gastrointestinal:  Soft, Non-tender  Skin: No rashes,  warm to touch  Psychiatry:  Mood & affect appropriate  Musculuskeletal: Knee dressing       All labs, Imaging and EKGs personally reviewed                 04-09-21 @ 07:01  -  04-10-21 @ 07:00  --------------------------------------------------------  IN: 1364 mL / OUT: 2750 mL / NET: -1386 mL    04-10-21 @ 07:01  -  04-10-21 @ 22:32  --------------------------------------------------------  IN: 480 mL / OUT: 0 mL / NET: 480 mL                          11.5   8.13  )-----------( 196      ( 09 Apr 2021 06:15 )             38.9               04-09    136  |  102  |  15  ----------------------------<  128<H>  4.4   |  23  |  0.93    Ca    8.7      09 Apr 2021 06:15

## 2021-04-10 NOTE — DISCHARGE NOTE PROVIDER - NSDCFUADDINST_GEN_ALL_CORE_FT
Followup with Dr. Cardona in his office for scheduled appointment after surgery for routine care/dressing change. Keep dressing clean and dry. Continue taking eliquis 2.5 mg twice daily for 4 weeks postoperatively for blood clot prevention.

## 2021-04-10 NOTE — PROGRESS NOTE ADULT - ASSESSMENT
Patient is a 71 y/o male, HTN, s/p right total knee replacement     # S/P total knee  # HTN     care as per Ortho   PT as tolerated  pain control   IV hydration  BP management  on norvasc 10  if uncontrolled , can add another BP agent, likely lisinopril 10 for better BP control   DVT and gI PPX 
S/P Revision R TKR    Plan    follow up OR cx's   RAMYA D/C'D   Eliquis for DVT prophylaxis  PT/OOB WBAT       Marcy Johns PA-C   Beeper    [id number]

## 2021-04-10 NOTE — DISCHARGE NOTE NURSING/CASE MANAGEMENT/SOCIAL WORK - PATIENT PORTAL LINK FT
You can access the FollowMyHealth Patient Portal offered by [facility] by registering at the following website: [email or web address]. By joining [facility]’s FollowMyHealth portal, you will also be able to view your health information using other applications (apps) compatible with our system.

## 2021-04-10 NOTE — DISCHARGE NOTE PROVIDER - CARE PROVIDER_API CALL
Bayron Cardona)  Orthopaedic Surgery  [address], [address]  [place] [address]  Phone: [phone]  Fax: [phone]  Follow Up Time:

## 2021-04-10 NOTE — DISCHARGE NOTE PROVIDER - HOSPITAL COURSE
Patient presented to [facility] on 4/8 for a scheduled R total knee revision arthroplasty with Dr. Cardona and tolerated procedure well. PT evaluated patient postoperatively and recommended discharge home with outpatient physical therapy. Rest of hospital stay unremarkable and patient discharged when medically cleared.

## 2021-04-10 NOTE — PROGRESS NOTE ADULT - SUBJECTIVE AND OBJECTIVE BOX
Patient is a 70y old  Male who presents with a chief complaint of right knee replacement revision  Goal: walk without cane/ and pain (25 Mar 2021 08:40)      POST OPERATIVE DAY #:  2  Patient comfortable  No complaints    T(C): 37.1 (04-10-21 @ 04:47), Max: 37.2 (04-09-21 @ 23:56)  HR: 80 (04-10-21 @ 04:47) (71 - 84)  BP: 146/89 (04-10-21 @ 04:47) (130/67 - 154/79)  RR: 17 (04-10-21 @ 04:47) (17 - 18)  SpO2: 99% (04-10-21 @ 04:47) (95% - 99%)  Wt(kg): --    PHYSICAL EXAM:  NAD, Alert  EXT:  Rt Knee: Dressing C/D/I;   knee immobilizer removed  Calves soft  (+) DF/PF;  EHL FHL:  No gross Sensation deficits noted   (+) Distal Pulses;   B/L, PAS     LABS:                        11.5<L>  8.13  )-----------( 196      ( 09 Apr 2021 06:15 )             38.9<L>    04-09    136  |  102  |  15  ----------------------------<  128<H>  4.4   |  23  |  0.93

## 2021-04-10 NOTE — DISCHARGE NOTE PROVIDER - NSDCMRMEDTOKEN_GEN_ALL_CORE_FT
AMLODIPINE BESYLATE 10 MG TAB: TAKE 1 TABLET BY MOUTH EVERY DAY  Rolling Walker: Dx: R Renaldo Knee Arthroplasty Revision  CLAUDIO: 99M   AMLODIPINE BESYLATE 10 MG TAB: TAKE 1 TABLET BY MOUTH EVERY DAY  apixaban 2.5 mg oral tablet: 1 tab(s) orally 2 times a day  oxyCODONE 5 mg oral tablet: 1-2 tab(s) orally every 4 hours, As Needed -Moderate-Severe Pain (4 - 10) MDD:12  pantoprazole 40 mg oral delayed release tablet: 1 tab(s) orally once a day (before a meal)  Rolling Walker: Dx: R Totak Knee Arthroplasty Revision  CLAUDIO: 99M  traMADol 50 mg oral tablet: 1 tab(s) orally every 6 hours, As needed, Mild Pain (1 - 3) MDD:4

## 2021-04-13 ENCOUNTER — APPOINTMENT (OUTPATIENT)
Dept: ORTHOPEDIC SURGERY | Facility: CLINIC | Age: 71
End: 2021-04-13
Payer: MEDICARE

## 2021-04-13 DIAGNOSIS — Z96.651 PAIN DUE TO INTERNAL ORTHOPEDIC PROSTHETIC DEVICES, IMPLANTS AND GRAFTS, INITIAL ENCOUNTER: ICD-10-CM

## 2021-04-13 DIAGNOSIS — Z96.659 PRESENCE OF UNSPECIFIED ARTIFICIAL KNEE JOINT: ICD-10-CM

## 2021-04-13 DIAGNOSIS — T84.84XA PAIN DUE TO INTERNAL ORTHOPEDIC PROSTHETIC DEVICES, IMPLANTS AND GRAFTS, INITIAL ENCOUNTER: ICD-10-CM

## 2021-04-13 DIAGNOSIS — M25.561 PAIN IN RIGHT KNEE: ICD-10-CM

## 2021-04-13 LAB
CULTURE RESULTS: SIGNIFICANT CHANGE UP
CULTURE RESULTS: SIGNIFICANT CHANGE UP
SPECIMEN SOURCE: SIGNIFICANT CHANGE UP
SPECIMEN SOURCE: SIGNIFICANT CHANGE UP

## 2021-04-13 PROCEDURE — 73564 X-RAY EXAM KNEE 4 OR MORE: CPT | Mod: RT

## 2021-04-13 PROCEDURE — 99024 POSTOP FOLLOW-UP VISIT: CPT

## 2021-04-13 NOTE — PHYSICAL EXAM
[de-identified] : Well developed, well nourished in no apparent distress, awake, alert and orientated to person, place and time with appropriate mood and affect\par Respirations are even and unlabored. Gait evaluation does not reveal a limp. There is no inguinal adenopathy. The affected limb is well-perfused with palpable pedal pulse, without skin lesions, shows a grossly normal motor and sensory examination. Incision is healing well. Knee motion is 0-110  [de-identified] : AP, lateral, sunrise knee x-rays of the right knee were ordered and obtained in the office and demonstrate satisfactory position and alignment of the components are present. No signs of loosening are seen.

## 2021-04-13 NOTE — DISCUSSION/SUMMARY
[de-identified] : The patient is doing well after revision joint replacement surgery. Written infectious precautions were reviewed. The patient will progress with physical therapy at this time and they will work on transitioning from requiring assistive devices for ambulation. Eliquis therapy will be discontinued at 1 month post surgery for the purpose of orthopedic thromboembolism prophylaxis. Return around the 2 week anniversary from surgery for follow-up evaluation.  Wound vac was remove today, Aquacel applied.

## 2021-04-13 NOTE — HISTORY OF PRESENT ILLNESS
[de-identified] : Status-post REVISION right total knee arthroplasty here for initial postoperative evaluation. He is experiencing urinary frequency without pain. Excellent progress is noted in terms of pain and restoration of function. Pain is well controlled with oral medications. There has been no change in medical health since discharge. The patient does require assistive devices. He is here today to remove his wound vac and apply aquacel

## 2021-04-19 RX ORDER — TRAMADOL HYDROCHLORIDE 50 MG/1
50 TABLET, COATED ORAL
Qty: 40 | Refills: 0 | Status: ACTIVE | COMMUNITY
Start: 2021-04-19 | End: 1900-01-01

## 2021-04-22 ENCOUNTER — APPOINTMENT (OUTPATIENT)
Dept: ORTHOPEDIC SURGERY | Facility: CLINIC | Age: 71
End: 2021-04-22
Payer: MEDICARE

## 2021-04-22 VITALS
SYSTOLIC BLOOD PRESSURE: 157 MMHG | DIASTOLIC BLOOD PRESSURE: 89 MMHG | BODY MASS INDEX: 24.39 KG/M2 | HEART RATE: 80 BPM | WEIGHT: 190 LBS

## 2021-04-22 LAB
CULTURE RESULTS: SIGNIFICANT CHANGE UP
SPECIMEN SOURCE: SIGNIFICANT CHANGE UP

## 2021-04-22 PROCEDURE — 99024 POSTOP FOLLOW-UP VISIT: CPT

## 2021-04-22 RX ORDER — OXYCODONE 5 MG/1
5 TABLET ORAL
Qty: 40 | Refills: 0 | Status: ACTIVE | COMMUNITY
Start: 2021-04-22 | End: 1900-01-01

## 2021-04-22 NOTE — PHYSICAL EXAM
[de-identified] : Well developed, well nourished in no apparent distress, awake, alert and orientated to person, place and time with appropriate mood and affect\par Respirations are even and unlabored. Gait evaluation does not reveal a limp. There is no inguinal adenopathy. The affected limb is well-perfused with palpable pedal pulse, without skin lesions, shows a grossly normal motor and sensory examination. Incision is healing well. Knee motion is 0-110

## 2021-04-22 NOTE — DISCUSSION/SUMMARY
[de-identified] : The patient is doing well after revision joint replacement surgery. Written infectious precautions were reviewed. The patient will progress with physical therapy at this time and they will work on transitioning from requiring assistive devices for ambulation. Eliquis therapy will be discontinued at 1 month post surgery for the purpose of orthopedic thromboembolism prophylaxis. Return around the 6 week anniversary from surgery for follow-up evaluation.  \par \par The patient returns to the office today for staple removal from the right knee wound. The staples have been in since surgery. The patient has no complaints. The wound is healing well and is without evidence of infection or wound dehiscence. The wound was prepped with betadine and a staple removal tool was used to remove all staples in their entirety. Steri strips were placed over the wound and the patient was instructed to leave these in place until they fall off on their own. The patient tolerated the procedure well and there were no immediate complications. The wound edges are well adhered. The patient was instructed to continue to keep it clean.\par

## 2021-04-22 NOTE — HISTORY OF PRESENT ILLNESS
[de-identified] : Status-post REVISION right total knee arthroplasty here for initial postoperative evaluation. He was experiencing urinary frequency. He was diagnosed with a UTI and finished abx. He is doing better.  Excellent progress is noted in terms of pain and restoration of function. Pain is well controlled with oral medications. There has been no change in medical health since discharge. The patient does require assistive devices. He is here today to remove staples

## 2021-05-08 LAB
CULTURE RESULTS: SIGNIFICANT CHANGE UP
SPECIMEN SOURCE: SIGNIFICANT CHANGE UP

## 2021-05-17 RX ORDER — OXYCODONE 5 MG/1
5 TABLET ORAL
Qty: 30 | Refills: 0 | Status: ACTIVE | COMMUNITY
Start: 2021-05-17 | End: 1900-01-01

## 2021-05-25 ENCOUNTER — APPOINTMENT (OUTPATIENT)
Dept: ORTHOPEDIC SURGERY | Facility: CLINIC | Age: 71
End: 2021-05-25
Payer: MEDICARE

## 2021-05-25 PROCEDURE — 99024 POSTOP FOLLOW-UP VISIT: CPT

## 2021-05-29 LAB
CULTURE RESULTS: SIGNIFICANT CHANGE UP
SPECIMEN SOURCE: SIGNIFICANT CHANGE UP

## 2021-05-29 NOTE — PHYSICAL EXAM
[de-identified] : Well developed, well nourished in no apparent distress, awake, alert and orientated to person, place and time with appropriate mood and affect\par Respirations are even and unlabored. Gait evaluation does not reveal a limp. There is no inguinal adenopathy. The affected limb is well-perfused with palpable pedal pulse, without skin lesions, shows a grossly normal motor and sensory examination. Incision is healing well. Knee motion is 0-120

## 2021-05-29 NOTE — DISCUSSION/SUMMARY
[de-identified] : The patient is doing well after revision joint replacement surgery. Written infectious precautions were reviewed. The patient will progress with physical therapy at this time.  Follow-up is recommended at the 6-month anniversary from the right total knee arthroplasty revision.

## 2021-05-29 NOTE — HISTORY OF PRESENT ILLNESS
[de-identified] : Status-post REVISION right total knee arthroplasty here for routine postoperative evaluation. Excellent progress is noted in terms of pain and restoration of function. Pain is well controlled with oral medications. There has been no change in medical health since discharge. The patient does not require assistive devices.

## 2021-06-15 RX ORDER — TRAMADOL HYDROCHLORIDE 50 MG/1
50 TABLET, COATED ORAL
Qty: 40 | Refills: 0 | Status: ACTIVE | COMMUNITY
Start: 2021-03-10 | End: 1900-01-01

## 2021-06-15 RX ORDER — OXYCODONE 5 MG/1
5 TABLET ORAL
Qty: 40 | Refills: 0 | Status: ACTIVE | COMMUNITY
Start: 2021-04-19 | End: 1900-01-01

## 2021-07-12 RX ORDER — TRAMADOL HYDROCHLORIDE 50 MG/1
50 TABLET, COATED ORAL
Qty: 40 | Refills: 0 | Status: ACTIVE | COMMUNITY
Start: 2021-07-12 | End: 1900-01-01

## 2021-07-20 ENCOUNTER — APPOINTMENT (OUTPATIENT)
Dept: ORTHOPEDIC SURGERY | Facility: CLINIC | Age: 71
End: 2021-07-20
Payer: MEDICARE

## 2021-07-20 DIAGNOSIS — M54.41 LUMBAGO WITH SCIATICA, RIGHT SIDE: ICD-10-CM

## 2021-07-20 DIAGNOSIS — G89.29 LUMBAGO WITH SCIATICA, RIGHT SIDE: ICD-10-CM

## 2021-07-20 PROCEDURE — 73564 X-RAY EXAM KNEE 4 OR MORE: CPT | Mod: RT

## 2021-07-20 PROCEDURE — 72100 X-RAY EXAM L-S SPINE 2/3 VWS: CPT

## 2021-07-20 PROCEDURE — 99214 OFFICE O/P EST MOD 30 MIN: CPT

## 2021-07-20 PROCEDURE — 99072 ADDL SUPL MATRL&STAF TM PHE: CPT

## 2021-07-20 NOTE — HISTORY OF PRESENT ILLNESS
[de-identified] : Status-post REVISION right total knee arthroplasty here for routine postoperative evaluation. Excellent progress is noted in terms of pain and restoration of function. Pain is well controlled with oral medications. There has been no change in medical health since discharge. Has had a slight increase in pain in the anterolateral aspect of the knee since he has become more active recently. Started using a cane again. Not using any anti-inflammatories for this. No fevers or chills. Does have a history of sciatica. Es.

## 2021-07-20 NOTE — DISCUSSION/SUMMARY
[de-identified] : The patient is doing well after revision joint replacement surgery. He has some pain with some iliotibial band syndrome. Encouraged to use Voltaren gel to this area. He is aware of his spine diagnosis of spinal listhesis L4-5 and degenerative disc disease. He is encouraged to see a physiatrist or spine surgeon. He states that he already has follow-up for this. Written infectious precautions were reviewed. The patient will progress with physical therapy at this time.  Follow-up is recommended at the 6-month anniversary from the right total knee arthroplasty revision.

## 2021-07-20 NOTE — PHYSICAL EXAM
[de-identified] : Well developed, well nourished in no apparent distress, awake, alert and orientated to person, place and time with appropriate mood and affect\par Respirations are even and unlabored. Gait evaluation does not reveal a limp. There is no inguinal adenopathy. The affected limb is well-perfused with palpable pedal pulse, without skin lesions, shows a grossly normal motor and sensory examination. Incision is healing well. Knee motion is 0-120. Mild tenderness palpation over the distal aspect of the iliotibial band. [de-identified] : AP, lateral, sunrise knee x-rays of the right knee were ordered and obtained in the office and demonstrate satisfactory position and alignment of the components are present. No signs of loosening are seen.\par \par AP and lateral radiographs of lumbar spine ordered obtained in the office demonstrate L4-5 spondylolisthesis and degenerative disc disease.

## 2021-09-28 ENCOUNTER — APPOINTMENT (OUTPATIENT)
Dept: ORTHOPEDIC SURGERY | Facility: CLINIC | Age: 71
End: 2021-09-28
Payer: MEDICARE

## 2021-09-28 VITALS
SYSTOLIC BLOOD PRESSURE: 148 MMHG | DIASTOLIC BLOOD PRESSURE: 80 MMHG | WEIGHT: 196 LBS | BODY MASS INDEX: 25.15 KG/M2 | HEART RATE: 80 BPM | HEIGHT: 74 IN

## 2021-09-28 PROCEDURE — 73564 X-RAY EXAM KNEE 4 OR MORE: CPT | Mod: RT

## 2021-09-28 PROCEDURE — 99214 OFFICE O/P EST MOD 30 MIN: CPT

## 2021-09-30 NOTE — DISCUSSION/SUMMARY
[de-identified] : The patient is doing well after right total knee revision joint replacement surgery. He has some pain with some iliotibial band syndrome. We again discussed that he has now followed up with a physiatrist to determine if his known spinal stenosis and degenerative disc disease at L4-5 could be contributing to this.  He can take over-the-counter NSAIDs.  Continue to use the cane as needed.  No evidence of failure of the total knee arthroplasty revision.  Follow-up is recommended in 6 months.

## 2021-09-30 NOTE — PHYSICAL EXAM
[de-identified] : Patient is well nourished, well-developed, in no acute distress, with appropriate mood and affect. The patient is oriented to time, place, and person. Respirations are even and unlabored. Gait evaluation does not reveal a limp. There is no inguinal adenopathy. Examination of the contralateral knee shows normal range of motion, strength, no tenderness, and intact skin. The operative limb is well-perfused, has a well appearing surgical scar, and shows a grossly normal motor and sensory examination. Knee motion is painless and the right knee moves from 0 to 125 degrees. The knee is stable within that range-of-motion to AP and ML stress.  Mild tenderness palpation over the distal aspect of the iliotibial band.. The alignment of the knee is neutral. Muscle strength is normal. Quadriceps and hamstring muscle strength is normal bilaterally. Pedal pulses are palpable.  [de-identified] : AP, lateral, sunrise knee x-rays of the right knee were ordered and obtained in the office and demonstrate satisfactory position and alignment of the components are present. No signs of loosening are seen.\par \par AP and lateral radiographs of lumbar spine were reviewed from the previous visit and demonstrate L4-5 spondylolisthesis and degenerative disc disease.

## 2021-09-30 NOTE — HISTORY OF PRESENT ILLNESS
[de-identified] : Status-post right total knee arthroplasty revision here for routine postoperative evaluation.  He has developed some lateral sided knee pain but also has a history of sciatica.  He has been previously referred to physiatry but never followed up with that.  Not taking any pain medications.  Voltaren does not help.  Excellent progress is noted in terms of pain and restoration of function.  Still using a cane intermittently.  The leg is not giving way.  He is able to navigate stairs.  Not using a knee brace.  Not taking oral NSAIDs.

## 2021-12-16 ENCOUNTER — APPOINTMENT (OUTPATIENT)
Dept: ORTHOPEDIC SURGERY | Facility: CLINIC | Age: 71
End: 2021-12-16
Payer: MEDICARE

## 2021-12-16 VITALS — WEIGHT: 196 LBS | HEIGHT: 74 IN | BODY MASS INDEX: 25.15 KG/M2

## 2021-12-16 PROCEDURE — 99214 OFFICE O/P EST MOD 30 MIN: CPT

## 2021-12-16 NOTE — HISTORY OF PRESENT ILLNESS
[de-identified] : This is a 71-year-old gentleman who is status-post right total knee arthroplasty revision here for routine postoperative evaluation.  He continues to complain of lateral sided knee pain but also has a history of sciatica.  Since his last visit he is follow-up with both physiatry and neurology.  They agree that he does have sciatica but they do not think that his pain is from that .  He says he has had an EMG which was normal.  Overall he says that he is better than before surgery but he still has some pain that he complains about.  He has pain every day.  Not taking any pain medications.  Voltaren does not help.  Excellent progress is noted in terms of pain and restoration of function.  Still using a cane intermittently.  The leg is not giving way.  He is able to navigate stairs.  Not using a knee brace.  Not taking oral NSAIDs.

## 2021-12-16 NOTE — PHYSICAL EXAM
[de-identified] : Patient is well nourished, well-developed, in no acute distress, with appropriate mood and affect. The patient is oriented to time, place, and person. Respirations are even and unlabored. Gait evaluation does not reveal a limp. There is no inguinal adenopathy. Examination of the contralateral knee shows normal range of motion, strength, no tenderness, and intact skin. The operative limb is well-perfused, has a well appearing surgical scar, and shows a grossly normal motor and sensory examination. Knee motion is painless and the right knee moves from 0 to 125 degrees. The knee is stable within that range-of-motion to AP and ML stress.  Mild tenderness palpation over the distal aspect of the iliotibial band.. The alignment of the knee is neutral. Muscle strength is normal. Quadriceps and hamstring muscle strength is normal bilaterally. Pedal pulses are palpable.

## 2021-12-16 NOTE — DISCUSSION/SUMMARY
[de-identified] : The patient is doing well after right total knee revision joint replacement surgery. He has some pain with some iliotibial band syndrome.  However the pain is not completely elucidated at this point.  It does appear to be extra-articular.  I would like to obtain an MRI with MARS protocol to evaluate for other pathology.  The patient will be sent for a MRI of the right knee. They will notify me when the MRI is complete and we will arrange for either an in person or telehealth virtual visit to review the results as well as any next steps in the plan.

## 2022-01-03 ENCOUNTER — APPOINTMENT (OUTPATIENT)
Dept: ORTHOPEDIC SURGERY | Facility: CLINIC | Age: 72
End: 2022-01-03
Payer: MEDICARE

## 2022-01-03 DIAGNOSIS — G89.29 PAIN IN RIGHT KNEE: ICD-10-CM

## 2022-01-03 DIAGNOSIS — M25.561 PAIN IN RIGHT KNEE: ICD-10-CM

## 2022-01-03 DIAGNOSIS — Z96.651 PRESENCE OF RIGHT ARTIFICIAL KNEE JOINT: ICD-10-CM

## 2022-01-03 PROCEDURE — 99442: CPT

## 2022-10-06 ENCOUNTER — NON-APPOINTMENT (OUTPATIENT)
Age: 72
End: 2022-10-06

## 2023-01-10 ENCOUNTER — NON-APPOINTMENT (OUTPATIENT)
Age: 73
End: 2023-01-10

## 2023-01-10 ENCOUNTER — APPOINTMENT (OUTPATIENT)
Dept: ORTHOPEDIC SURGERY | Facility: CLINIC | Age: 73
End: 2023-01-10
Payer: MEDICARE

## 2023-01-10 VITALS
SYSTOLIC BLOOD PRESSURE: 150 MMHG | HEIGHT: 74 IN | HEART RATE: 77 BPM | TEMPERATURE: 98 F | DIASTOLIC BLOOD PRESSURE: 95 MMHG | BODY MASS INDEX: 25.67 KG/M2 | WEIGHT: 200 LBS

## 2023-01-10 PROCEDURE — 73562 X-RAY EXAM OF KNEE 3: CPT | Mod: RT

## 2023-01-10 PROCEDURE — 99214 OFFICE O/P EST MOD 30 MIN: CPT

## 2023-01-10 NOTE — DISCUSSION/SUMMARY
[de-identified] : I discussed nonoperative and operative treatment options for their symptoms.  I explained that while I see the MRI and bone scan studies, radiographically on x-rays does not appear to have changed overall alignment.  I explained that in the setting of continued pain after revision knee replacement, the for step in work-up would be obtaining lab work including a CBC, ESR, CRP as well as an aspiration of the right knee.  He does not believe is infected at this time I would not like to go forward with those steps.  I explained to him that overall the metal components appear well aligned and the only concern would be if the patellar component is undergoing wear however the x-rays appear stable and I am not suspicious of that at this time.  Cannot give him a diagnosis as I cannot recreate his symptoms on exam.  He would not like to do any more physical therapy as he has done extensive amount after his revision surgery and his symptoms have persisted.  Explained to him that unfortunately revision knee replacements often do not feel as normal as the native knee.  He reports that he still much better compared to prior to his revision surgery as he was not able to even walk prior to the revision surgery.  I explained that if he wanted to undergo the work-up we would start with the blood work however I would not rush to any surgical procedure as I am not sure that his symptoms can be resolved with additional surgery.  He understands and will try to continue with his activities of daily living and take pain medicine necessary.  He knows to return with any continued symptoms or if he chooses to undergo further work-up. \par \par He was offered a prescription for an anti-inflammatory to help with the pain however he does not want to take any pain medicine unless he absolutely needs to.

## 2023-01-10 NOTE — HISTORY OF PRESENT ILLNESS
[de-identified] : MATTY FUCHS  is an 72 year-year-old male presents today status post revision right total knee arthroplasty on 4/2021 with my colleague.  He is coming in for second opinion.  He had a history of a right total knee replacement done in 6/2012 at [facility] that is complicated by loosening and implant recall from which she underwent revision surgery.  After his revision surgery he had improvement in his pain but has since not had full relief of his symptoms.  He finds it his symptoms are worse with deep flexion and anterior aspect of the knee.  It is limiting his ability to do activities he enjoys such as fishing.  He had an MRI of the knee that showed some circumferential circumferential fluid around the tibial and femoral stem as well as a small knee effusion he also had a bone scan that showed some concern for possible medial femoral and tibial hyperemia and could not rule out loosening.  He denies symptoms of start up pain or pain with weightbearing.  His symptoms are mainly due to deep knee flexion.\par \par No fever, chills, or signs of infection. Today, patient does not state any other associated signs or complaints outside of those described. \par \par A complete review of symptoms as well as past medical/surgical history, medications, allergies, social and family history, and other details of HPI and exam were reviewed per first visit intake form and updated accordingly. Additional and more relevant details are noted in further detail today.\par

## 2023-01-10 NOTE — PHYSICAL EXAM
[de-identified] : General Appearance / Station: Well developed, well nourished, in no acute distress \par Orientation: Oriented to person, place, and time\par Gait & Station: Ambulates without assistive device\par Neurologic: Normal leg sensation \par Cardiovascular: Warm extremity \par Lymphatics: No lymphedema \par Generalized Ligament Laxity: Normal \par Stiffness: Normal \par \par RIGHT HIP:\par Range of motion: Painless  internal and external rotation of the hip.\par Strength: Within Normal Limits \par Palpation: Nontender  at greater trochanter. Nontender  at SI joint\par Stinchfield: Negative \par FADIR: Negative \par CATY: Negative \par \par SYMPTOMATIC RIGHT KNEE:\par Alignment: Neutral \par Skin: well healed incision\par Effusion: none .\par Quadriceps: normal .\par Range of motion: symmetrical but painful with deep flexion.\par PF crepitus: 1+.\par PF apprehension: none .\par Patella / Patella Tendon: nontender .\par Lachman's: negative \par Valgus @ 30°: negative.\par Varus @ 30°: negative.\par Posterior drawer: negative.\par Palpation: No significant TTP.\par Meniscus signs: None\par \par ASYMPTOMATIC LEFT KNEE:\par Alignment: Neutral\par Skin: none\par Effusion: none .\par Quadriceps: normal .\par Range of motion: symmetrical .\par PF crepitus: none .\par PF apprehension: none .\par Patella / Patella Tendon: nontender .\par Lachman's: negative \par Valgus @ 30°: negative.\par Varus @ 30°: negative.\par Posterior drawer: negative.\par Palpation: nontender.\par Meniscus signs: negative .\par  [de-identified] : Imaging: Three views of the right knee show satisfactory placement of a right hybrid fixation revision total knee arthroplasty. There are no changes in alignment of hardware and no signs of radiographic failure compared to previous radiographs.\par

## 2023-01-26 NOTE — PATIENT PROFILE ADULT - SURGICAL SITE INCISION
no Detail Level: Detailed Depth Of Biopsy: dermis Was A Bandage Applied: Yes Size Of Lesion In Cm: 1.7 X Size Of Lesion In Cm: 0 Biopsy Type: H and E Biopsy Method: Dermablade Anesthesia Type: 1% lidocaine with epinephrine Anesthesia Volume In Cc (Will Not Render If 0): 0.5 Hemostasis: Aluminum Chloride Wound Care: Aquaphor Dressing: bandage Destruction After The Procedure: No Type Of Destruction Used: Curettage Curettage Text: The wound bed was treated with curettage after the biopsy was performed. Cryotherapy Text: The wound bed was treated with cryotherapy after the biopsy was performed. Electrodesiccation Text: The wound bed was treated with electrodesiccation after the biopsy was performed. Electrodesiccation And Curettage Text: The wound bed was treated with electrodesiccation and curettage after the biopsy was performed. Silver Nitrate Text: The wound bed was treated with silver nitrate after the biopsy was performed. Lab: [id number] Consent: Written consent was obtained and risks were reviewed including but not limited to scarring, infection, bleeding, scabbing, incomplete removal, nerve damage and allergy to anesthesia. Post-Care Instructions: I reviewed with the patient in detail post-care instructions. Patient is to keep the biopsy site dry overnight, and then apply bacitracin twice daily until healed. Patient may apply hydrogen peroxide soaks to remove any crusting. Notification Instructions: Patient will be notified of biopsy results. However, patient instructed to call the office if not contacted within 2 weeks. Billing Type: Third-Party Bill Information: Selecting Yes will display possible errors in your note based on the variables you have selected. This validation is only offered as a suggestion for you. PLEASE NOTE THAT THE VALIDATION TEXT WILL BE REMOVED WHEN YOU FINALIZE YOUR NOTE. IF YOU WANT TO FAX A PRELIMINARY NOTE YOU WILL NEED TO TOGGLE THIS TO 'NO' IF YOU DO NOT WANT IT IN YOUR FAXED NOTE.

## 2024-02-22 NOTE — DISCHARGE NOTE NURSING/CASE MANAGEMENT/SOCIAL WORK - NSDCPEELIQUISCOMP_GEN_ALL_CORE
aerobic capacity/endurance/gait, locomotion, and balance/muscle strength Apixaban/Eliquis is used to treat and prevent blood clots. If you are not able to swallow the tablets whole, they may be crushed and mixed in water, apple juice, or applesauce and promptly taken within four hours. Never skip a dose of Apixaban/Eliquis. If you forget to take your Apixaban/Eliquis, take a dose as soon as you remember. If it is almost time for your next Apixaban/Eliquis dose, wait until then and take a regular dose. DO NOT take an extra pill to ‘catch up’.  NEVER TAKE A DOUBLE DOSE. Notify your doctor that you missed a dose. Take Apixaban/Eliquis at the same time each morning and evening. Apixaban/Eliquis may be taken with other medication or food.

## 2024-04-03 NOTE — PHYSICAL THERAPY INITIAL EVALUATION ADULT - ADL SKILLS, REHAB EVAL
Pt fell on Sunday, hit head, has been having headaches and nausea since then. Also endorses fevers.   independent

## 2024-05-26 NOTE — CONSULT NOTE ADULT - SUBJECTIVE AND OBJECTIVE BOX
Medication given may have significant effects after discharge. Therefore on the day of surgery:  1) you must be accompanied by a responsible adult upon discharge and for 24 hours after surgery.  Do not drive a motor vehicle, operate machinery, power tools or appliance, drink alcoholic beverages, or make critical decisions for 24 hours  2) Be aware of dizziness, which may cause a fall. Change positions slowly.  3) Eating: you may resume your regular diet but it is better to increase intake slowly with mild foods and working up to your regular diet. No greasy, fried or spicy foods today.  4) Nausea/Vomiting: Nausea and vomiting may occur as you become more active or begin to increase food intake. If this should happen, decrease activity and return to liquids. If the problem persists, call your surgeon  5) Pain: Your surgeon may have given you a prescription for pain medication. Take pain medication with food as prescribed. Pain medication may cause constipation, so drink plenty of fluids. If your pain medication does not provide adequate relief, call your surgeon  6) Urinating: Notify your surgeon if you have not urinated within 12 hours after discharge  7) Ice: Apply ice to operative site for 20 min 5-6 times a day or use Polar care as instructed  8) Dressing:    Do not remove the steri-strips. (no bath/ hot tubs/ pools)   [x]  Leave dressing in place. Keep dressing/ incision clean and dry    9) Activity      Knee/ Ankle/ Foot   [x] elevate extremity   [x] Crutches/ walker     [x] non-weight bearing to operative extremity      DVT prophylaxis for 30 days      10) Begin physical therapy if advised by you physician:   [] before returning to see you doctor   [] not until you follow up with your doctor    11) call your doctor at [phone] for an appointment (or follow up as scheduled)    Contact [facility] office if  Increased redness, swelling, drainage of any kind, and/or pain to surgery site.  As well  Patient is a 69 y/o male, HTN, s/p right total knee replacement in 2012, c/o of severe pain in the right knee for 2 months. Difficulty walking , started using cane a few weeks ago. Went to Dr Cardona, was given tramadol for pain. Xray was done in the office, S/P right total knee replacement. Was in the ER 2 weeks ago with abdominal pain, diagnosed with appendicitis, s/p appendectomy 2 weeks ago, Denies covid exposure , was negative 2 weeks ago prior to surgery.      REVIEW OF SYSTEMS:    CONSTITUTIONAL: No weakness, fevers or chills  EYES/ENT: No visual changes;  No vertigo or throat pain   NECK: No pain or stiffness  RESPIRATORY: No cough, wheezing, hemoptysis; No shortness of breath  CARDIOVASCULAR: No chest pain or palpitations  GASTROINTESTINAL: No abdominal or epigastric pain. No nausea, vomiting, or hematemesis; No diarrhea or constipation. No melena or hematochezia.  GENITOURINARY: No dysuria, frequency or hematuria  NEUROLOGICAL: No numbness or weakness  SKIN: No itching, burning, rashes, or lesions   All other review of systems is negative unless indicated above.    MEDICATIONS  (STANDING):  acetaminophen  IVPB .. 1000 milliGRAM(s) IV Intermittent every 8 hours  amLODIPine   Tablet 10 milliGRAM(s) Oral daily  apixaban 2.5 milliGRAM(s) Oral two times a day  ascorbic acid 500 milliGRAM(s) Oral two times a day  benzocaine 15 mG/menthol 3.6 mG (Sugar-Free) Lozenge 1 Lozenge Oral every 3 hours  ceFAZolin   IVPB 2000 milliGRAM(s) IV Intermittent once  ferrous    sulfate 325 milliGRAM(s) Oral daily  folic acid 1 milliGRAM(s) Oral daily  HYDROmorphone  Injectable 0.5 milliGRAM(s) IV Push once  ketorolac   Injectable 15 milliGRAM(s) IV Push every 6 hours  lactated ringers. 1000 milliLiter(s) (75 mL/Hr) IV Continuous <Continuous>  multivitamin 1 Tablet(s) Oral daily  pantoprazole    Tablet 40 milliGRAM(s) Oral before breakfast  polyethylene glycol 3350 17 Gram(s) Oral at bedtime  senna 2 Tablet(s) Oral at bedtime    MEDICATIONS  (PRN):  magnesium hydroxide Suspension 30 milliLiter(s) Oral daily PRN Constipation  ondansetron Injectable 4 milliGRAM(s) IV Push every 6 hours PRN Nausea and/or Vomiting  oxyCODONE    IR 5 milliGRAM(s) Oral every 3 hours PRN Moderate Pain (4 - 6)  oxyCODONE    IR 10 milliGRAM(s) Oral every 3 hours PRN Severe Pain (7 - 10)  traMADol 50 milliGRAM(s) Oral every 6 hours PRN Mild Pain (1 - 3)    Home Medications:  AMLODIPINE BESYLATE 10 MG TAB: TAKE 1 TABLET BY MOUTH EVERY DAY (08 Apr 2021 12:18)    Allergies  No Known Allergies  Intolerances    Vital Signs Last 24 Hrs  T(C): 36.7 (09 Apr 2021 16:31), Max: 36.7 (09 Apr 2021 16:31)  T(F): 98 (09 Apr 2021 16:31), Max: 98 (09 Apr 2021 16:31)  HR: 71 (09 Apr 2021 16:31) (66 - 88)  BP: 150/77 (09 Apr 2021 16:31) (107/72 - 154/79)  BP(mean): 90 (08 Apr 2021 21:30) (85 - 100)  RR: 18 (09 Apr 2021 16:31) (14 - 18)  SpO2: 97% (09 Apr 2021 16:31) (94% - 100%)    Appearance: Normal	  HEENT:   Normal oral mucosa, PERRL, EOMI	  Lymphatic: No lymphadenopathy , no edema  Cardiovascular: Normal S1 S2, No JVD  Respiratory: Lungs clear to auscultation, normal effort 	  Gastrointestinal:  Soft, Non-tender, + BS	  Skin: No rashes, No ecchymoses, No cyanosis, warm to touch  Musculoskeletal: knee swelling   Psychiatry:  Mood & affect appropriate  Ext: No edema                          11.5   8.13  )-----------( 196      ( 09 Apr 2021 06:15 )             38.9               04-09    136  |  102  |  15  ----------------------------<  128<H>  4.4   |  23  |  0.93    Ca    8.7      09 Apr 2021 06:15

## 2024-12-30 NOTE — ED ADULT TRIAGE NOTE - [facility] (M2)
Called pt and LVM to schedule pt. In message stated that soonest availability is 1/29/25. Call back number for office left in message to schedule.   
Hello,    [facility]       Thank you.  
2.15